# Patient Record
Sex: FEMALE | Race: WHITE | NOT HISPANIC OR LATINO
[De-identification: names, ages, dates, MRNs, and addresses within clinical notes are randomized per-mention and may not be internally consistent; named-entity substitution may affect disease eponyms.]

---

## 2018-03-23 ENCOUNTER — APPOINTMENT (OUTPATIENT)
Dept: OPHTHALMOLOGY | Facility: CLINIC | Age: 68
End: 2018-03-23
Payer: MEDICARE

## 2018-03-23 PROCEDURE — 92083 EXTENDED VISUAL FIELD XM: CPT

## 2018-03-23 PROCEDURE — 92020 GONIOSCOPY: CPT

## 2018-03-23 PROCEDURE — 76514 ECHO EXAM OF EYE THICKNESS: CPT

## 2018-03-23 PROCEDURE — 92004 COMPRE OPH EXAM NEW PT 1/>: CPT

## 2018-03-23 PROCEDURE — 92250 FUNDUS PHOTOGRAPHY W/I&R: CPT

## 2018-08-20 RX ORDER — LATANOPROST/PF 0.005 %
0.01 DROPS OPHTHALMIC (EYE)
Qty: 1 | Refills: 11 | Status: ACTIVE | COMMUNITY
Start: 2018-03-23 | End: 1900-01-01

## 2018-09-21 ENCOUNTER — APPOINTMENT (OUTPATIENT)
Dept: OPHTHALMOLOGY | Facility: CLINIC | Age: 68
End: 2018-09-21

## 2018-12-10 ENCOUNTER — APPOINTMENT (OUTPATIENT)
Dept: OPHTHALMOLOGY | Facility: CLINIC | Age: 68
End: 2018-12-10
Payer: MEDICARE

## 2018-12-10 DIAGNOSIS — H40.003 PREGLAUCOMA, UNSPECIFIED, BILATERAL: ICD-10-CM

## 2018-12-10 PROCEDURE — 92012 INTRM OPH EXAM EST PATIENT: CPT

## 2018-12-10 PROCEDURE — 92083 EXTENDED VISUAL FIELD XM: CPT

## 2019-01-18 ENCOUNTER — APPOINTMENT (OUTPATIENT)
Dept: OPHTHALMOLOGY | Facility: CLINIC | Age: 69
End: 2019-01-18
Payer: MEDICARE

## 2019-01-18 DIAGNOSIS — H52.12 MYOPIA, LEFT EYE: ICD-10-CM

## 2019-01-18 DIAGNOSIS — H52.201 HYPERMETROPIA, RIGHT EYE: ICD-10-CM

## 2019-01-18 DIAGNOSIS — H52.4 MYOPIA, LEFT EYE: ICD-10-CM

## 2019-01-18 DIAGNOSIS — H52.4 HYPERMETROPIA, UNSPECIFIED EYE: ICD-10-CM

## 2019-01-18 DIAGNOSIS — H52.01 HYPERMETROPIA, RIGHT EYE: ICD-10-CM

## 2019-01-18 DIAGNOSIS — H52.202 MYOPIA, LEFT EYE: ICD-10-CM

## 2019-01-18 DIAGNOSIS — H52.4 HYPERMETROPIA, RIGHT EYE: ICD-10-CM

## 2019-01-18 DIAGNOSIS — H52.209 HYPERMETROPIA, UNSPECIFIED EYE: ICD-10-CM

## 2019-01-18 DIAGNOSIS — H52.00 HYPERMETROPIA, UNSPECIFIED EYE: ICD-10-CM

## 2019-01-18 PROCEDURE — 92015 DETERMINE REFRACTIVE STATE: CPT

## 2019-01-22 ENCOUNTER — APPOINTMENT (OUTPATIENT)
Dept: OPHTHALMOLOGY | Facility: CLINIC | Age: 69
End: 2019-01-22

## 2019-02-11 ENCOUNTER — APPOINTMENT (OUTPATIENT)
Dept: OPHTHALMOLOGY | Facility: CLINIC | Age: 69
End: 2019-02-11
Payer: MEDICARE

## 2019-02-11 DIAGNOSIS — H25.813 COMBINED FORMS OF AGE-RELATED CATARACT, BILATERAL: ICD-10-CM

## 2019-02-11 PROCEDURE — 99212 OFFICE O/P EST SF 10 MIN: CPT

## 2019-05-23 ENCOUNTER — FORM ENCOUNTER (OUTPATIENT)
Age: 69
End: 2019-05-23

## 2019-05-24 ENCOUNTER — APPOINTMENT (OUTPATIENT)
Dept: ORTHOPEDIC SURGERY | Facility: CLINIC | Age: 69
End: 2019-05-24
Payer: MEDICARE

## 2019-05-24 ENCOUNTER — APPOINTMENT (OUTPATIENT)
Dept: RADIOLOGY | Facility: CLINIC | Age: 69
End: 2019-05-24

## 2019-05-24 ENCOUNTER — OUTPATIENT (OUTPATIENT)
Dept: OUTPATIENT SERVICES | Facility: HOSPITAL | Age: 69
LOS: 1 days | End: 2019-05-24
Payer: MEDICARE

## 2019-05-24 VITALS — WEIGHT: 160 LBS | BODY MASS INDEX: 30.21 KG/M2 | RESPIRATION RATE: 16 BRPM | HEIGHT: 61 IN

## 2019-05-24 PROCEDURE — 28470 CLTX METATARSAL FX WO MNP EA: CPT | Mod: LT

## 2019-05-24 PROCEDURE — 73630 X-RAY EXAM OF FOOT: CPT

## 2019-05-24 PROCEDURE — 73610 X-RAY EXAM OF ANKLE: CPT

## 2019-05-24 PROCEDURE — 73610 X-RAY EXAM OF ANKLE: CPT | Mod: 26,LT

## 2019-05-24 PROCEDURE — 73630 X-RAY EXAM OF FOOT: CPT | Mod: 26,LT

## 2019-05-24 PROCEDURE — 99204 OFFICE O/P NEW MOD 45 MIN: CPT | Mod: 57

## 2019-06-07 ENCOUNTER — APPOINTMENT (OUTPATIENT)
Dept: OPHTHALMOLOGY | Facility: CLINIC | Age: 69
End: 2019-06-07
Payer: MEDICARE

## 2019-06-07 ENCOUNTER — NON-APPOINTMENT (OUTPATIENT)
Age: 69
End: 2019-06-07

## 2019-06-07 PROCEDURE — 92014 COMPRE OPH EXAM EST PT 1/>: CPT

## 2019-06-07 PROCEDURE — 92133 CPTRZD OPH DX IMG PST SGM ON: CPT

## 2019-06-07 PROCEDURE — 92020 GONIOSCOPY: CPT

## 2019-06-07 PROCEDURE — 92136 OPHTHALMIC BIOMETRY: CPT

## 2019-06-17 ENCOUNTER — APPOINTMENT (OUTPATIENT)
Dept: UROLOGY | Facility: CLINIC | Age: 69
End: 2019-06-17
Payer: MEDICARE

## 2019-06-17 VITALS — DIASTOLIC BLOOD PRESSURE: 82 MMHG | TEMPERATURE: 97.5 F | HEART RATE: 67 BPM | SYSTOLIC BLOOD PRESSURE: 164 MMHG

## 2019-06-17 PROCEDURE — 99204 OFFICE O/P NEW MOD 45 MIN: CPT

## 2019-06-18 LAB
APPEARANCE: CLEAR
BACTERIA: NEGATIVE
BILIRUBIN URINE: NEGATIVE
BLOOD URINE: ABNORMAL
COLOR: NORMAL
GLUCOSE QUALITATIVE U: NEGATIVE
HYALINE CASTS: 0 /LPF
KETONES URINE: NEGATIVE
LEUKOCYTE ESTERASE URINE: NEGATIVE
MICROSCOPIC-UA: NORMAL
NITRITE URINE: NEGATIVE
PH URINE: 8
PROTEIN URINE: NEGATIVE
RED BLOOD CELLS URINE: 8 /HPF
SPECIFIC GRAVITY URINE: 1.02
SQUAMOUS EPITHELIAL CELLS: 2 /HPF
UROBILINOGEN URINE: NORMAL
WHITE BLOOD CELLS URINE: 2 /HPF

## 2019-06-18 NOTE — ASSESSMENT
[FreeTextEntry1] : 67yo female with painless, microscopic hematuria\par Former smoker\par ? remote history of kidney stones\par Recommend UA, urine culture\par Ordered CT urogram\par Recommend f/u for cystoscopy. Reviewed procedure, indications and risks

## 2019-06-18 NOTE — HISTORY OF PRESENT ILLNESS
[FreeTextEntry1] : This is a very pleasant 67yo female referred for microscopic hematuria found on routine testing. She is a former light smoker, quit many years ago. She thinks she may have had a kidney stone several years ago but she is unsure. History of HTN, GERD, otherwise feeling well.  [Urinary Urgency] : urinary urgency [Urinary Frequency] : urinary frequency [Nocturia] : nocturia [Hematuria - Microscopic] : microscopic hematuria [Abdominal Pain] : no abdominal pain [Dysuria] : no dysuria [Flank Pain] : no flank pain [None] : None

## 2019-06-18 NOTE — LETTER BODY
[Dear  ___] : Dear  [unfilled], [Please see my note below.] : Please see my note below. [Courtesy Letter:] : I had the pleasure of seeing your patient, [unfilled], in my office today. [Consult Closing:] : Thank you very much for allowing me to participate in the care of this patient.  If you have any questions, please do not hesitate to contact me. [FreeTextEntry3] : Jose De Jesus Bustos MD\par Urologic Oncology\par Department of Urology\par [facility]\par \par [employer] [facility] \par \par  [Sincerely,] : Sincerely, [FreeTextEntry2] : Dani Bush MD\par [address]\par [place] [address]

## 2019-06-18 NOTE — PHYSICAL EXAM
[General Appearance - Well Developed] : well developed [Normal Appearance] : normal appearance [General Appearance - Well Nourished] : well nourished [Edema] : no peripheral edema [General Appearance - In No Acute Distress] : no acute distress [Well Groomed] : well groomed [Respiration, Rhythm And Depth] : normal respiratory rhythm and effort [Exaggerated Use Of Accessory Muscles For Inspiration] : no accessory muscle use [Abdomen Tenderness] : non-tender [Abdomen Soft] : soft [] : no rash [Costovertebral Angle Tenderness] : no ~M costovertebral angle tenderness [Normal Station and Gait] : the gait and station were normal for the patient's age [Oriented To Time, Place, And Person] : oriented to person, place, and time [No Focal Deficits] : no focal deficits [Affect] : the affect was normal [Mood] : the mood was normal [Not Anxious] : not anxious

## 2019-06-19 LAB — BACTERIA UR CULT: NORMAL

## 2019-07-05 ENCOUNTER — APPOINTMENT (OUTPATIENT)
Dept: CT IMAGING | Facility: HOSPITAL | Age: 69
End: 2019-07-05

## 2019-07-31 ENCOUNTER — APPOINTMENT (OUTPATIENT)
Dept: UROLOGY | Facility: CLINIC | Age: 69
End: 2019-07-31

## 2019-08-28 ENCOUNTER — APPOINTMENT (OUTPATIENT)
Dept: UROLOGY | Facility: CLINIC | Age: 69
End: 2019-08-28

## 2019-09-21 ENCOUNTER — APPOINTMENT (OUTPATIENT)
Dept: CT IMAGING | Facility: HOSPITAL | Age: 69
End: 2019-09-21
Payer: MEDICARE

## 2019-09-24 ENCOUNTER — FORM ENCOUNTER (OUTPATIENT)
Age: 69
End: 2019-09-24

## 2019-09-25 ENCOUNTER — APPOINTMENT (OUTPATIENT)
Dept: CT IMAGING | Facility: HOSPITAL | Age: 69
End: 2019-09-25

## 2019-09-25 ENCOUNTER — OUTPATIENT (OUTPATIENT)
Dept: OUTPATIENT SERVICES | Facility: HOSPITAL | Age: 69
LOS: 1 days | End: 2019-09-25
Payer: MEDICARE

## 2019-09-25 PROCEDURE — 74178 CT ABD&PLV WO CNTR FLWD CNTR: CPT | Mod: 26

## 2019-09-25 PROCEDURE — 74178 CT ABD&PLV WO CNTR FLWD CNTR: CPT

## 2019-10-01 ENCOUNTER — RESULT REVIEW (OUTPATIENT)
Age: 69
End: 2019-10-01

## 2021-04-11 ENCOUNTER — NON-APPOINTMENT (OUTPATIENT)
Age: 71
End: 2021-04-11

## 2021-04-13 ENCOUNTER — APPOINTMENT (OUTPATIENT)
Dept: OPHTHALMOLOGY | Facility: CLINIC | Age: 71
End: 2021-04-13
Payer: MEDICARE

## 2021-04-13 ENCOUNTER — NON-APPOINTMENT (OUTPATIENT)
Age: 71
End: 2021-04-13

## 2021-04-13 PROCEDURE — 99072 ADDL SUPL MATRL&STAF TM PHE: CPT

## 2021-04-13 PROCEDURE — 92014 COMPRE OPH EXAM EST PT 1/>: CPT

## 2021-04-13 PROCEDURE — 92133 CPTRZD OPH DX IMG PST SGM ON: CPT

## 2021-05-05 ENCOUNTER — APPOINTMENT (OUTPATIENT)
Dept: PHYSICAL MEDICINE AND REHAB | Facility: CLINIC | Age: 71
End: 2021-05-05

## 2021-06-30 ENCOUNTER — APPOINTMENT (OUTPATIENT)
Dept: ORTHOPEDIC SURGERY | Facility: CLINIC | Age: 71
End: 2021-06-30

## 2021-08-24 ENCOUNTER — NON-APPOINTMENT (OUTPATIENT)
Age: 71
End: 2021-08-24

## 2021-08-24 ENCOUNTER — APPOINTMENT (OUTPATIENT)
Dept: OPHTHALMOLOGY | Facility: CLINIC | Age: 71
End: 2021-08-24
Payer: MEDICARE

## 2021-08-24 PROCEDURE — 92014 COMPRE OPH EXAM EST PT 1/>: CPT

## 2021-08-24 PROCEDURE — 92133 CPTRZD OPH DX IMG PST SGM ON: CPT

## 2021-09-27 ENCOUNTER — APPOINTMENT (OUTPATIENT)
Dept: RHEUMATOLOGY | Facility: CLINIC | Age: 71
End: 2021-09-27

## 2021-10-26 ENCOUNTER — LABORATORY RESULT (OUTPATIENT)
Age: 71
End: 2021-10-26

## 2021-10-26 ENCOUNTER — APPOINTMENT (OUTPATIENT)
Dept: RHEUMATOLOGY | Facility: CLINIC | Age: 71
End: 2021-10-26
Payer: MEDICARE

## 2021-10-26 VITALS
HEART RATE: 73 BPM | OXYGEN SATURATION: 98 % | HEIGHT: 61 IN | BODY MASS INDEX: 29.45 KG/M2 | TEMPERATURE: 98.7 F | WEIGHT: 156 LBS | DIASTOLIC BLOOD PRESSURE: 61 MMHG | SYSTOLIC BLOOD PRESSURE: 201 MMHG | RESPIRATION RATE: 14 BRPM

## 2021-10-26 VITALS — DIASTOLIC BLOOD PRESSURE: 90 MMHG | SYSTOLIC BLOOD PRESSURE: 210 MMHG

## 2021-10-26 VITALS — SYSTOLIC BLOOD PRESSURE: 215 MMHG | DIASTOLIC BLOOD PRESSURE: 79 MMHG

## 2021-10-26 DIAGNOSIS — D68.59 OTHER PRIMARY THROMBOPHILIA: ICD-10-CM

## 2021-10-26 DIAGNOSIS — R76.8 OTHER SPECIFIED ABNORMAL IMMUNOLOGICAL FINDINGS IN SERUM: ICD-10-CM

## 2021-10-26 PROCEDURE — 99204 OFFICE O/P NEW MOD 45 MIN: CPT

## 2021-10-26 NOTE — PHYSICAL EXAM
[General Appearance - Well Nourished] : well nourished [General Appearance - Well Developed] : well developed [Sclera] : the sclera and conjunctiva were normal [Auscultation Breath Sounds / Voice Sounds] : lungs were clear to auscultation bilaterally [Heart Sounds] : normal S1 and S2 [Heart Sounds Gallop] : no gallops [Abdomen Tenderness] : non-tender [] : no rash [Skin Lesions] : no skin lesions [Oriented To Time, Place, And Person] : oriented to person, place, and time [FreeTextEntry1] : murmur ausculated in mitral area

## 2021-10-26 NOTE — ASSESSMENT
[FreeTextEntry1] : 72 y/o F w positive JULIO C\par =positive JULIO C\par =hx of pruritic rashes\par =dry eyes\par \par Pt w positive JULIO C. \par \par Explained that positive JULIO C is a general test, which is found in high titers in autoimmune dz. Explained that positive JULIO C is not diagnostic of AI dz, and patient wout autoimmune dz can also have high JULIO C. Individuals w high JULIO C are at higher risk of developing AI dz however. \par \par Given hx of SICCA symptoms and rashes, will screen for AI dz. \par \par Pt w osteopenia, and two foot fractures. While FRAX score calculated does not indicated tx, will have discuss w patient risk and benefits for tx osteopenia given pt w 2 past fractures. \par \par Plan\par -Labs w serologies, inflammatory markers\par RTO in 2 weeks to discuss dx and treatment plan\par

## 2021-10-26 NOTE — HISTORY OF PRESENT ILLNESS
[FreeTextEntry1] : Referring physicain: Dr. Lupillo Rodarte\par T: [phone]\par F: [phone]\par \par 70 y/o F here for consultation \par \par Pt getting pruritic rashes for 1 year. Pt describes white/red patches in legs, w bruising. Pt went to allergist, and had skin testing. No allergies were found. Pt was found to have positive JULIO C 1:320. \par \par No new beauty product, no new wash, no detergent, new animals. \par \par Reports R knee pain, swelling. Worse w walking standing. Pt only able to walk 60 steps. AM stiffness that lasts less than 10 minutes. Uses voltaren. \par Pt was sent to PT, which helps. \par \par Pt also recenlty had DEXA scan showing osteopenia. Pt 4 years ago, had two fractures, one on each feet after fall. \par \par No fevers, h/a, hair loss, oral ulcers, epistaxis, sinusitis,  swollen glands, dry mouth, CP, SOB, cough, vision changes, abdominal pain,  n/v/d, blood in stool or urine, focal weakness, sensory loss,  Raynaud's,swelling, weight loss. \par +dry eyes, uses artificial tears \par +GERD \par \par

## 2021-10-26 NOTE — CONSULT LETTER
[Dear  ___] : Dear  [unfilled], [Consult Letter:] : I had the pleasure of evaluating your patient, [unfilled]. [Consult Closing:] : Thank you very much for allowing me to participate in the care of this patient.  If you have any questions, please do not hesitate to contact me. [Sincerely,] : Sincerely, [FreeTextEntry2] : Dr. Lupillo Rodarte\par [address] [facility] [address], \[place] [address] [FreeTextEntry3] : Jeronimo Storey MD

## 2021-10-26 NOTE — DATA REVIEWED
[FreeTextEntry1] : Lab reviewed from 6/21\par JULIO C 1:320\par \par DEXA scan reviewed from outside:\par T score -1.5

## 2021-11-03 ENCOUNTER — NON-APPOINTMENT (OUTPATIENT)
Age: 71
End: 2021-11-03

## 2021-11-03 DIAGNOSIS — R70.0 ELEVATED ERYTHROCYTE SEDIMENTATION RATE: ICD-10-CM

## 2021-11-05 PROBLEM — R70.0 ELEVATED ERYTHROCYTE SEDIMENTATION RATE: Status: ACTIVE | Noted: 2021-11-05

## 2021-11-05 LAB
25(OH)D3 SERPL-MCNC: 62 NG/ML
ALBUMIN SERPL ELPH-MCNC: 4.7 G/DL
ALP BLD-CCNC: 84 U/L
ALT SERPL-CCNC: 13 U/L
ANA PAT FLD IF-IMP: ABNORMAL
ANA SER IF-ACNC: ABNORMAL
ANION GAP SERPL CALC-SCNC: 17 MMOL/L
APPEARANCE: CLEAR
AST SERPL-CCNC: 25 U/L
B2 GLYCOPROT1 IGA SERPL IA-ACNC: 44.4 SAU
B2 GLYCOPROT1 IGG SER-ACNC: <5 SGU
B2 GLYCOPROT1 IGM SER-ACNC: <5 SMU
BACTERIA: NEGATIVE
BASOPHILS # BLD AUTO: 0.02 K/UL
BASOPHILS NFR BLD AUTO: 0.3 %
BILIRUB SERPL-MCNC: 0.2 MG/DL
BILIRUBIN URINE: NEGATIVE
BLOOD URINE: ABNORMAL
BUN SERPL-MCNC: 20 MG/DL
C3 SERPL-MCNC: 126 MG/DL
C4 SERPL-MCNC: 18 MG/DL
CALCIUM SERPL-MCNC: 10.2 MG/DL
CARDIOLIPIN IGM SER-MCNC: 5.3 GPL
CARDIOLIPIN IGM SER-MCNC: <5 MPL
CHLORIDE SERPL-SCNC: 98 MMOL/L
CK SERPL-CCNC: 45 U/L
CO2 SERPL-SCNC: 22 MMOL/L
COLOR: NORMAL
CREAT SERPL-MCNC: 0.51 MG/DL
CREAT SPEC-SCNC: 41 MG/DL
CREAT/PROT UR: 0.2 RATIO
CRP SERPL-MCNC: 7 MG/L
DEPRECATED CARDIOLIPIN IGA SER: 5.8 APL
DSDNA AB SER-ACNC: 22 IU/ML
ENA RNP AB SER IA-ACNC: 1.1 AL
ENA SM AB SER IA-ACNC: <0.2 AL
ENA SS-A AB SER IA-ACNC: <0.2 AL
ENA SS-B AB SER IA-ACNC: <0.2 AL
EOSINOPHIL # BLD AUTO: 0.02 K/UL
EOSINOPHIL NFR BLD AUTO: 0.3 %
ERYTHROCYTE [SEDIMENTATION RATE] IN BLOOD BY WESTERGREN METHOD: 71 MM/HR
G6PD SER-CCNC: 12.5 U/G HGB
GLUCOSE QUALITATIVE U: NEGATIVE
GLUCOSE SERPL-MCNC: 118 MG/DL
HCT VFR BLD CALC: 39.8 %
HGB BLD-MCNC: 12.5 G/DL
HYALINE CASTS: 0 /LPF
IMM GRANULOCYTES NFR BLD AUTO: 0.3 %
KETONES URINE: NEGATIVE
LEUKOCYTE ESTERASE URINE: NEGATIVE
LYMPHOCYTES # BLD AUTO: 1.41 K/UL
LYMPHOCYTES NFR BLD AUTO: 22.1 %
MAN DIFF?: NORMAL
MCHC RBC-ENTMCNC: 29 PG
MCHC RBC-ENTMCNC: 31.4 GM/DL
MCV RBC AUTO: 92.3 FL
MICROSCOPIC-UA: NORMAL
MONOCYTES # BLD AUTO: 0.41 K/UL
MONOCYTES NFR BLD AUTO: 6.4 %
NEUTROPHILS # BLD AUTO: 4.51 K/UL
NEUTROPHILS NFR BLD AUTO: 70.6 %
NITRITE URINE: NEGATIVE
PH URINE: 6.5
PLATELET # BLD AUTO: 247 K/UL
POTASSIUM SERPL-SCNC: 4.3 MMOL/L
PROT SERPL-MCNC: 7.9 G/DL
PROT UR-MCNC: 7 MG/DL
PROTEIN URINE: NEGATIVE
RBC # BLD: 4.31 M/UL
RBC # FLD: 14.2 %
RED BLOOD CELLS URINE: 9 /HPF
SODIUM SERPL-SCNC: 137 MMOL/L
SPECIFIC GRAVITY URINE: 1.01
SQUAMOUS EPITHELIAL CELLS: 1 /HPF
UROBILINOGEN URINE: NORMAL
WBC # FLD AUTO: 6.39 K/UL
WHITE BLOOD CELLS URINE: 0 /HPF

## 2021-11-08 ENCOUNTER — EMERGENCY (EMERGENCY)
Facility: HOSPITAL | Age: 71
LOS: 1 days | Discharge: HOME | End: 2021-11-08
Attending: EMERGENCY MEDICINE | Admitting: EMERGENCY MEDICINE
Payer: MEDICARE

## 2021-11-08 ENCOUNTER — APPOINTMENT (OUTPATIENT)
Dept: UROLOGY | Facility: CLINIC | Age: 71
End: 2021-11-08
Payer: MEDICARE

## 2021-11-08 VITALS — HEART RATE: 76 BPM | SYSTOLIC BLOOD PRESSURE: 214 MMHG | TEMPERATURE: 98.3 F | DIASTOLIC BLOOD PRESSURE: 76 MMHG

## 2021-11-08 VITALS — DIASTOLIC BLOOD PRESSURE: 79 MMHG | SYSTOLIC BLOOD PRESSURE: 204 MMHG

## 2021-11-08 VITALS
OXYGEN SATURATION: 100 % | DIASTOLIC BLOOD PRESSURE: 78 MMHG | SYSTOLIC BLOOD PRESSURE: 211 MMHG | HEIGHT: 61 IN | WEIGHT: 158.95 LBS | RESPIRATION RATE: 18 BRPM | HEART RATE: 85 BPM | TEMPERATURE: 98 F

## 2021-11-08 DIAGNOSIS — I10 ESSENTIAL (PRIMARY) HYPERTENSION: ICD-10-CM

## 2021-11-08 DIAGNOSIS — Z86.79 PERSONAL HISTORY OF OTHER DISEASES OF THE CIRCULATORY SYSTEM: ICD-10-CM

## 2021-11-08 LAB
ALBUMIN SERPL ELPH-MCNC: 4.2 G/DL — SIGNIFICANT CHANGE UP (ref 3.3–5)
ALP SERPL-CCNC: 75 U/L — SIGNIFICANT CHANGE UP (ref 40–120)
ALT FLD-CCNC: 11 U/L — SIGNIFICANT CHANGE UP (ref 10–45)
ANION GAP SERPL CALC-SCNC: 11 MMOL/L — SIGNIFICANT CHANGE UP (ref 5–17)
AST SERPL-CCNC: 23 U/L — SIGNIFICANT CHANGE UP (ref 10–40)
BASOPHILS # BLD AUTO: 0.04 K/UL — SIGNIFICANT CHANGE UP (ref 0–0.2)
BASOPHILS NFR BLD AUTO: 0.5 % — SIGNIFICANT CHANGE UP (ref 0–2)
BILIRUB SERPL-MCNC: 0.2 MG/DL — SIGNIFICANT CHANGE UP (ref 0.2–1.2)
BUN SERPL-MCNC: 12 MG/DL — SIGNIFICANT CHANGE UP (ref 7–23)
CALCIUM SERPL-MCNC: 10.7 MG/DL — HIGH (ref 8.4–10.5)
CHLORIDE SERPL-SCNC: 99 MMOL/L — SIGNIFICANT CHANGE UP (ref 96–108)
CO2 SERPL-SCNC: 27 MMOL/L — SIGNIFICANT CHANGE UP (ref 22–31)
CREAT SERPL-MCNC: 0.51 MG/DL — SIGNIFICANT CHANGE UP (ref 0.5–1.3)
EOSINOPHIL # BLD AUTO: 0.02 K/UL — SIGNIFICANT CHANGE UP (ref 0–0.5)
EOSINOPHIL NFR BLD AUTO: 0.3 % — SIGNIFICANT CHANGE UP (ref 0–6)
GLUCOSE SERPL-MCNC: 106 MG/DL — HIGH (ref 70–99)
HCT VFR BLD CALC: 38.3 % — SIGNIFICANT CHANGE UP (ref 34.5–45)
HGB BLD-MCNC: 12.4 G/DL — SIGNIFICANT CHANGE UP (ref 11.5–15.5)
IMM GRANULOCYTES NFR BLD AUTO: 0.3 % — SIGNIFICANT CHANGE UP (ref 0–1.5)
LYMPHOCYTES # BLD AUTO: 1.42 K/UL — SIGNIFICANT CHANGE UP (ref 1–3.3)
LYMPHOCYTES # BLD AUTO: 18.4 % — SIGNIFICANT CHANGE UP (ref 13–44)
MCHC RBC-ENTMCNC: 28.9 PG — SIGNIFICANT CHANGE UP (ref 27–34)
MCHC RBC-ENTMCNC: 32.4 GM/DL — SIGNIFICANT CHANGE UP (ref 32–36)
MCV RBC AUTO: 89.3 FL — SIGNIFICANT CHANGE UP (ref 80–100)
MONOCYTES # BLD AUTO: 0.5 K/UL — SIGNIFICANT CHANGE UP (ref 0–0.9)
MONOCYTES NFR BLD AUTO: 6.5 % — SIGNIFICANT CHANGE UP (ref 2–14)
NEUTROPHILS # BLD AUTO: 5.7 K/UL — SIGNIFICANT CHANGE UP (ref 1.8–7.4)
NEUTROPHILS NFR BLD AUTO: 74 % — SIGNIFICANT CHANGE UP (ref 43–77)
NRBC # BLD: 0 /100 WBCS — SIGNIFICANT CHANGE UP (ref 0–0)
PLATELET # BLD AUTO: 213 K/UL — SIGNIFICANT CHANGE UP (ref 150–400)
POTASSIUM SERPL-MCNC: 4.3 MMOL/L — SIGNIFICANT CHANGE UP (ref 3.5–5.3)
POTASSIUM SERPL-SCNC: 4.3 MMOL/L — SIGNIFICANT CHANGE UP (ref 3.5–5.3)
PROT SERPL-MCNC: 8.2 G/DL — SIGNIFICANT CHANGE UP (ref 6–8.3)
RBC # BLD: 4.29 M/UL — SIGNIFICANT CHANGE UP (ref 3.8–5.2)
RBC # FLD: 13.8 % — SIGNIFICANT CHANGE UP (ref 10.3–14.5)
SODIUM SERPL-SCNC: 137 MMOL/L — SIGNIFICANT CHANGE UP (ref 135–145)
TROPONIN T SERPL-MCNC: 0.01 NG/ML — SIGNIFICANT CHANGE UP (ref 0–0.01)
WBC # BLD: 7.7 K/UL — SIGNIFICANT CHANGE UP (ref 3.8–10.5)
WBC # FLD AUTO: 7.7 K/UL — SIGNIFICANT CHANGE UP (ref 3.8–10.5)

## 2021-11-08 PROCEDURE — 99213 OFFICE O/P EST LOW 20 MIN: CPT

## 2021-11-08 PROCEDURE — 84484 ASSAY OF TROPONIN QUANT: CPT

## 2021-11-08 PROCEDURE — 36415 COLL VENOUS BLD VENIPUNCTURE: CPT

## 2021-11-08 PROCEDURE — 85025 COMPLETE CBC W/AUTO DIFF WBC: CPT

## 2021-11-08 PROCEDURE — 99284 EMERGENCY DEPT VISIT MOD MDM: CPT

## 2021-11-08 PROCEDURE — 80053 COMPREHEN METABOLIC PANEL: CPT

## 2021-11-08 PROCEDURE — 99283 EMERGENCY DEPT VISIT LOW MDM: CPT

## 2021-11-08 RX ORDER — ACETAMINOPHEN 500 MG
650 TABLET ORAL ONCE
Refills: 0 | Status: COMPLETED | OUTPATIENT
Start: 2021-11-08 | End: 2021-11-08

## 2021-11-08 RX ORDER — AMLODIPINE BESYLATE 2.5 MG/1
5 TABLET ORAL ONCE
Refills: 0 | Status: COMPLETED | OUTPATIENT
Start: 2021-11-08 | End: 2021-11-08

## 2021-11-08 RX ADMIN — Medication 650 MILLIGRAM(S): at 17:02

## 2021-11-08 RX ADMIN — AMLODIPINE BESYLATE 5 MILLIGRAM(S): 2.5 TABLET ORAL at 17:03

## 2021-11-08 NOTE — ED ADULT TRIAGE NOTE - CHIEF COMPLAINT QUOTE
Patient states went to see Urologist/Dr. Mcnally and was found to have blood in urine and elevated /80, sent to ED due to elevated BP.  Patient denies any headache, no dizziness, no lightheadedness.  States just feeling some fatigue and bilateral knee pain, no dysuria.

## 2021-11-08 NOTE — LETTER BODY
[Dear  ___] : Dear  [unfilled], [Courtesy Letter:] : I had the pleasure of seeing your patient, [unfilled], in my office today. [Please see my note below.] : Please see my note below. [Consult Closing:] : Thank you very much for allowing me to participate in the care of this patient.  If you have any questions, please do not hesitate to contact me. [Sincerely,] : Sincerely, [FreeTextEntry2] : Dani Bush MD\par [address], [address]\par [place] [address] [FreeTextEntry3] : Jose De Jesus Bustos MD\par Urologic Oncology\par Department of Urology\par [facility]\par \par [employer] [facility] \par \par

## 2021-11-08 NOTE — HISTORY OF PRESENT ILLNESS
[FreeTextEntry1] : This is a very pleasant 67yo female referred for microscopic hematuria found on routine testing. She is a former light smoker, quit many years ago. She thinks she may have had a kidney stone several years ago but she is unsure. History of HTN, GERD, otherwise feeling well. \par \par 11/8/21 Last seen in 2019, lost to follow up due to pandemic. Completed CT urogram which was normal but did not return for cysto. Recent UA still shows microhematuria. Blood pressure is very high > 200 systolic. No headache/dizziness/chest pain or blurry vision.  [Dysuria] : no dysuria [Hematuria - Microscopic] : microscopic hematuria [Abdominal Pain] : no abdominal pain [Flank Pain] : no flank pain [None] : None

## 2021-11-08 NOTE — REVIEW OF SYSTEMS
[Fever] : no fever [Chills] : no chills [Feeling Poorly] : not feeling poorly [Feeling Tired] : not feeling tired [Arthralgias] : arthralgias [Negative] : Psychiatric

## 2021-11-08 NOTE — ED PROVIDER NOTE - CLINICAL SUMMARY MEDICAL DECISION MAKING FREE TEXT BOX
72 y/o F presents for hypertension in 200s/80s while at urology office today. Plan for basic labs and pain med. 72 y/o F presents for hypertension in 200s/80s while at urology office today. Plan for basic labs, pain med, and Norvasc.

## 2021-11-08 NOTE — ED PROVIDER NOTE - NSFOLLOWUPINSTRUCTIONS_ED_ALL_ED_FT
Please call your PCP tomorrow am to set up an urgent appointment to discuss your high blood pressures for the past several weeks. Your blood work was normal today.     Hypertension    Hypertension, commonly called high blood pressure, is when the force of blood pumping through your arteries is too strong. Hypertension forces your heart to work harder to pump blood. Your arteries may become narrow or stiff. Having untreated or uncontrolled hypertension for a long period of time can cause heart attack, stroke, kidney disease, and other problems. If started on a medication, take exactly as prescribed by your health care professional. Maintain a healthy lifestyle and follow up with your primary care physician.    SEEK IMMEDIATE MEDICAL CARE IF YOU HAVE ANY OF THE FOLLOWING SYMPTOMS: severe headache, confusion, chest pain, abdominal pain, vomiting, or shortness of breath.

## 2021-11-08 NOTE — ASSESSMENT
[FreeTextEntry1] : 70yo female with painless, microscopic hematuria, first diagnosed 2019 but lost to followup\par Former smoker\par CT urogram 2019 negative\par F/u for cystoscopy and renal US\par Repeat UA, culture, cytology\par \par Blood pressure > 200 systolic. Asymptomatic. Advised to go to [facility] ER immediately for evaluation

## 2021-11-08 NOTE — ED PROVIDER NOTE - PATIENT PORTAL LINK FT
You can access the FollowMyHealth Patient Portal offered by [facility] by registering at the following website: [email or web address]. By joining [employer]’s FollowMyHealth portal, you will also be able to view your health information using other applications (apps) compatible with our system.

## 2021-11-09 LAB
APPEARANCE: CLEAR
BACTERIA: NORMAL /HPF
BILIRUBIN URINE: NEGATIVE
BLOOD URINE: ABNORMAL
COLOR: YELLOW
GLUCOSE QUALITATIVE U: NEGATIVE
KETONES URINE: NEGATIVE
LEUKOCYTE ESTERASE URINE: ABNORMAL
MICROSCOPIC-UA: NORMAL
NITRITE URINE: NEGATIVE
PH URINE: 6
PROTEIN URINE: NEGATIVE MG/DL
RED BLOOD CELLS URINE: < 5 /HPF
SPECIFIC GRAVITY URINE: <=1.005
SQUAMOUS EPITHELIAL CELLS: NORMAL /HPF
UROBILINOGEN URINE: 0.2 E.U./DL
WHITE BLOOD CELLS URINE: < 5 /HPF

## 2021-11-10 LAB — URINE CYTOLOGY: NORMAL

## 2021-11-11 PROBLEM — I10 ESSENTIAL (PRIMARY) HYPERTENSION: Chronic | Status: ACTIVE | Noted: 2021-11-08

## 2021-11-12 LAB — BACTERIA UR CULT: ABNORMAL

## 2021-11-15 ENCOUNTER — APPOINTMENT (OUTPATIENT)
Dept: UROLOGY | Facility: CLINIC | Age: 71
End: 2021-11-15

## 2021-11-19 LAB
EJ AB SER QL: NEGATIVE
ENA JO1 AB SER IA-ACNC: <20 UNITS
ENA PM/SCL AB SER-ACNC: 34 UNITS
ENA SM+RNP AB SER IA-ACNC: <20 UNITS
ENA SS-A IGG SER QL: <20 UNITS
FIBRILLARIN AB SER QL: NEGATIVE
KU AB SER QL: NEGATIVE
MDA-5 (P140)(CADM-140): <20 UNITS
MI2 AB SER QL: NEGATIVE
NXP-2 (P140): <20 UNITS
OJ AB SER QL: NEGATIVE
PL12 AB SER QL: NEGATIVE
PL7 AB SER QL: NEGATIVE
SRP AB SERPL QL: NEGATIVE
TIF GAMMA (P155/140): <20 UNITS
U2 SNRNP AB SER QL: NEGATIVE

## 2021-11-30 ENCOUNTER — APPOINTMENT (OUTPATIENT)
Dept: HEART AND VASCULAR | Facility: CLINIC | Age: 71
End: 2021-11-30

## 2022-01-05 ENCOUNTER — NON-APPOINTMENT (OUTPATIENT)
Age: 72
End: 2022-01-05

## 2022-01-12 NOTE — PHYSICAL EXAM
[LE] : Sensory: Intact in bilateral lower extremities [Normal RLE] : Right Lower Extremity: No scars, rashes, lesions, ulcers, skin intact [Normal LLE] : Left Lower Extremity: No scars, rashes, lesions, ulcers, skin intact [Normal Touch] : sensation intact for touch [Normal] : Oriented to person, place, and time, insight and judgement were intact and the affect was normal [de-identified] : Right knee with left for comparison [de-identified] : \par X-rays of the right knee weightbearing 4 views today

## 2022-01-13 ENCOUNTER — APPOINTMENT (OUTPATIENT)
Dept: ORTHOPEDIC SURGERY | Facility: CLINIC | Age: 72
End: 2022-01-13

## 2022-01-21 ENCOUNTER — APPOINTMENT (OUTPATIENT)
Dept: ORTHOPEDIC SURGERY | Facility: CLINIC | Age: 72
End: 2022-01-21
Payer: MEDICARE

## 2022-01-21 VITALS
DIASTOLIC BLOOD PRESSURE: 73 MMHG | WEIGHT: 154 LBS | HEIGHT: 61 IN | SYSTOLIC BLOOD PRESSURE: 183 MMHG | BODY MASS INDEX: 29.07 KG/M2

## 2022-01-21 PROCEDURE — 99213 OFFICE O/P EST LOW 20 MIN: CPT | Mod: 25

## 2022-01-21 PROCEDURE — 73564 X-RAY EXAM KNEE 4 OR MORE: CPT | Mod: 50

## 2022-01-21 PROCEDURE — 20610 DRAIN/INJ JOINT/BURSA W/O US: CPT | Mod: RT

## 2022-01-21 NOTE — ASSESSMENT
[FreeTextEntry1] : 71-year-old woman with severe right knee medial compartment osteoarthritis with possible evidence of prior avascular necrosis based on findings on the MRI.  In August there was significant bone marrow edema in the medial compartment femoral condyle and tibial plateau which may be reactive from the arthritis.  There is also a joint effusion which seems to have resolved.  She has chronic pain mostly with walking and not at rest.  Steroid injection was done today to see if this gives her relief.  She does not tolerate NSAIDs well but could take Tylenol and use Voltaren gel as needed.\par She should continue with the exercises she learned in therapy which she has been doing on her own and we gave her a sheet of exercises today as well.  Low impact exercise.\par She is somewhat overweight.  Weight loss can help.\par Heat and ice as needed.  She had very elevated sed rate and JULIO C.  No rheumatoid arthritis was diagnosed.  Her x-rays and MRI look more like degenerative arthritis than inflammatory arthritis.\par I spoke to her about hyaluronic acid injections as another option and ultimately knee replacement may be considered.\par She will follow-up in about 4 to 6 weeks and we will see how she is doing.

## 2022-01-21 NOTE — PROCEDURE
[Injection] : Injection [Right] : of the right [Knee Joint] : knee joint [Osteoarthritis] : Osteoarthritis [Patient] : patient [Risk] : risk [Benefits] : benefits [Alternatives] : alternatives [Bleeding] : bleeding [Infection] : infection [Allergic Reaction] : allergic reaction [Verbal Consent Obtained] : verbal consent was obtained prior to the procedure [Alcohol] : Alcohol [Betadine] : Betadine [Ethyl Chloride Spray] : ethyl chloride spray was used as a topical anesthetic [Lateral] : lateral [Same Needle/New Syringe] : the syringe was changed and the same needle was left in place and [1% Lidocaine___(mL)] : [unfilled] mL of 1% Lidocaine [Triamcinolone 40mg/mL___(mL)] : [unfilled] ~UmL of 40mg/mL triamcinolone [Bandage Applied] : a bandage [Tolerated Well] : The patient tolerated the procedure well [None] : none [No Strenuous Activity___day(s)] : avoid strenuous activity for [unfilled] day(s) [PRN] : as needed

## 2022-01-21 NOTE — PHYSICAL EXAM
[Slightly Antalgic] : slightly antalgic [LE] : Sensory: Intact in bilateral lower extremities [Normal RLE] : Right Lower Extremity: No scars, rashes, lesions, ulcers, skin intact [Normal LLE] : Left Lower Extremity: No scars, rashes, lesions, ulcers, skin intact [Normal Touch] : sensation intact for touch [Normal] : Oriented to person, place, and time, insight and judgement were intact and the affect was normal [de-identified] : Knees\par Mildly antalgic gait.\par No edema, ecchymoses, erythema.  No effusion of the knees.  No significant warmth.\par Knee range of motion is from few degree flexion contracture on the right to 120 to 125 degrees with crepitus and pain on full flexion in the right knee.  Left knee is from 0 to 130 degrees without pain.\par Normal varus and valgus laxity.  Ia Lachman.  Negative anterior and posterior drawer.\par Intact extensor mechanism.\par Tender medial joint line and patella facets right knee and minimally on the left.\par \par Nodules DIP and less PIP joints in the hands consistent with osteoarthritis of her hands [de-identified] : Overweight [de-identified] : \par X-rays of the knees weightbearing 4 views taken today showed severe degenerative change in the medial compartment of the right knee and mild in the left knee with joint space narrowing.  There is subchondral sclerosis.  Mild degeneration patellofemoral joint.\par \par August 9, 2021 MRI of the right knee at [facility] showed severe loss of cartilage and subchondral bone marrow edema in the medial femoral condyle and medial tibial plateau and extrusion of the meniscus consistent with severe degeneration.  Degeneration of the meniscus.  Mucoid degeneration of the ACL as well.  Degeneration lateral compartment as well.  Moderate joint effusion

## 2022-01-31 ENCOUNTER — APPOINTMENT (OUTPATIENT)
Dept: HEART AND VASCULAR | Facility: CLINIC | Age: 72
End: 2022-01-31
Payer: MEDICARE

## 2022-01-31 ENCOUNTER — NON-APPOINTMENT (OUTPATIENT)
Age: 72
End: 2022-01-31

## 2022-01-31 ENCOUNTER — RX RENEWAL (OUTPATIENT)
Age: 72
End: 2022-01-31

## 2022-01-31 ENCOUNTER — APPOINTMENT (OUTPATIENT)
Dept: OPHTHALMOLOGY | Facility: CLINIC | Age: 72
End: 2022-01-31
Payer: MEDICARE

## 2022-01-31 VITALS
SYSTOLIC BLOOD PRESSURE: 183 MMHG | HEIGHT: 61 IN | BODY MASS INDEX: 29.07 KG/M2 | TEMPERATURE: 97.4 F | WEIGHT: 154 LBS | HEART RATE: 66 BPM | DIASTOLIC BLOOD PRESSURE: 73 MMHG

## 2022-01-31 DIAGNOSIS — R07.9 CHEST PAIN, UNSPECIFIED: ICD-10-CM

## 2022-01-31 PROCEDURE — 92014 COMPRE OPH EXAM EST PT 1/>: CPT

## 2022-01-31 PROCEDURE — 92133 CPTRZD OPH DX IMG PST SGM ON: CPT

## 2022-01-31 PROCEDURE — 99205 OFFICE O/P NEW HI 60 MIN: CPT | Mod: 25

## 2022-01-31 PROCEDURE — 92136 OPHTHALMIC BIOMETRY: CPT

## 2022-01-31 PROCEDURE — 93000 ELECTROCARDIOGRAM COMPLETE: CPT

## 2022-01-31 RX ORDER — LOSARTAN POTASSIUM 100 MG/1
TABLET, FILM COATED ORAL
Refills: 0 | Status: DISCONTINUED | COMMUNITY
End: 2022-01-31

## 2022-01-31 RX ORDER — LABETALOL HYDROCHLORIDE 100 MG/1
100 TABLET, FILM COATED ORAL
Refills: 0 | Status: DISCONTINUED | COMMUNITY
End: 2022-01-31

## 2022-01-31 NOTE — HISTORY OF PRESENT ILLNESS
[FreeTextEntry1] : \par 70 y/o female with h/o htn, glaucoma, overweight, hematuria who presents for initial evaluation today for clearance for cataract surgery next week\par \par \par notes new cp in past few months\par notes some maria\par no syncope, lh, palpitations, edema, orthopnea, pnd\par \par notes snoring\par \par not exercising\par \par Had moderate hiatal hernia on CT abd/pelvis\par \par seen by rheum for elevated esr/funmilayo\par \par PMH/PSH:\par htn\par glaucoma\par funmilayo positive\par hematuria\par low vit D\par osteopenia\par thrombophilia\par gerd\par gout\par cataract\par overweight\par moderate hiatal hernia\par \par MEDS:\par amlodipine/benazepril 10/40\par labetalol 100 mg qd\par prilosec\par \par \par ALL:\par epi\par \par \par SH:\par no tobacco, quit , former tobacco 20 years, 5-10 cigs/day\par no etoh/drugs\par from [place]\par lived [place] since \par retired - used to work for nonprofit \par \par no children\par \par \par FH:\par mother - heart disease 50's, htn,  91\par father - h/o prostate disease,  73\par siblings - htn\par

## 2022-01-31 NOTE — PHYSICAL EXAM

## 2022-01-31 NOTE — DISCUSSION/SUMMARY
[Patient] : the patient [___ Week(s)] : in [unfilled] week(s) [FreeTextEntry1] : \par 70 y/o female with h/o htn, glaucoma, overweight, hematuria who presents for initial evaluation today for clearance for cataract surgery next week and htn\par \par -ordered CTA for cp\par -consider gi/surg eval for hiatal hernia given chest discomfort\par -ordered Echo for murmur, cp\par -ordered labs and workup for secondary cause htn\par -referral for sleep apnea eval\par -ordered ELIZA duplex for htn\par -ordered ekg - nsr, normal intervals, nsra\par -nutrition referral\par -continue norvasc/benazepril, start coreg 6.25 mg bid, dc labetalol, add hctz 25 mg qd\par -consider asa, statin pending CT\par -counseled on cvd risk factors\par -uro for w/up hematuria\par -cataract surgery to hold until bp controlled and CVD workup done\par -f/up 2-3 weeks for bp/bmp\par \par I have spent 60 minutes reviewing labs, records, tests and discussing bp, cvd risk factor control

## 2022-02-07 ENCOUNTER — RESULT REVIEW (OUTPATIENT)
Age: 72
End: 2022-02-07

## 2022-02-07 ENCOUNTER — APPOINTMENT (OUTPATIENT)
Dept: CT IMAGING | Facility: CLINIC | Age: 72
End: 2022-02-07
Payer: MEDICARE

## 2022-02-07 ENCOUNTER — APPOINTMENT (OUTPATIENT)
Dept: UROLOGY | Facility: CLINIC | Age: 72
End: 2022-02-07

## 2022-02-07 ENCOUNTER — APPOINTMENT (OUTPATIENT)
Dept: ULTRASOUND IMAGING | Facility: CLINIC | Age: 72
End: 2022-02-07
Payer: MEDICARE

## 2022-02-07 ENCOUNTER — OUTPATIENT (OUTPATIENT)
Dept: OUTPATIENT SERVICES | Facility: HOSPITAL | Age: 72
LOS: 1 days | End: 2022-02-07

## 2022-02-07 LAB
ALBUMIN SERPL ELPH-MCNC: 4.5 G/DL
ALDOSTERONE SERUM: 29.4 NG/DL
ALP BLD-CCNC: 82 U/L
ALT SERPL-CCNC: 15 U/L
ANION GAP SERPL CALC-SCNC: 13 MMOL/L
AST SERPL-CCNC: 23 U/L
BASOPHILS # BLD AUTO: 0.03 K/UL
BASOPHILS NFR BLD AUTO: 0.4 %
BILIRUB SERPL-MCNC: 0.3 MG/DL
BUN SERPL-MCNC: 22 MG/DL
CALCIUM SERPL-MCNC: 9.8 MG/DL
CALCIUM SERPL-MCNC: 9.9 MG/DL
CHLORIDE SERPL-SCNC: 94 MMOL/L
CHOLEST SERPL-MCNC: 204 MG/DL
CO2 SERPL-SCNC: 27 MMOL/L
CREAT SERPL-MCNC: 0.63 MG/DL
EOSINOPHIL # BLD AUTO: 0.04 K/UL
EOSINOPHIL NFR BLD AUTO: 0.5 %
ESTIMATED AVERAGE GLUCOSE: 126 MG/DL
GLUCOSE SERPL-MCNC: 75 MG/DL
HBA1C MFR BLD HPLC: 6 %
HCT VFR BLD CALC: 38.6 %
HDLC SERPL-MCNC: 84 MG/DL
HGB BLD-MCNC: 12.3 G/DL
IMM GRANULOCYTES NFR BLD AUTO: 0.4 %
LDLC SERPL CALC-MCNC: 107 MG/DL
LYMPHOCYTES # BLD AUTO: 1.61 K/UL
LYMPHOCYTES NFR BLD AUTO: 22 %
MAGNESIUM SERPL-MCNC: 2.2 MG/DL
MAN DIFF?: NORMAL
MCHC RBC-ENTMCNC: 28.5 PG
MCHC RBC-ENTMCNC: 31.9 GM/DL
MCV RBC AUTO: 89.6 FL
MONOCYTES # BLD AUTO: 0.58 K/UL
MONOCYTES NFR BLD AUTO: 7.9 %
NEUTROPHILS # BLD AUTO: 5.03 K/UL
NEUTROPHILS NFR BLD AUTO: 68.8 %
NONHDLC SERPL-MCNC: 119 MG/DL
PARATHYROID HORMONE INTACT: 82 PG/ML
PLATELET # BLD AUTO: 213 K/UL
POTASSIUM SERPL-SCNC: 4.6 MMOL/L
PROT SERPL-MCNC: 7.6 G/DL
RBC # BLD: 4.31 M/UL
RBC # FLD: 13.6 %
RENIN PLASMA: 221.2 PG/ML
SODIUM SERPL-SCNC: 135 MMOL/L
TRIGL SERPL-MCNC: 62 MG/DL
TSH SERPL-ACNC: 2.05 UIU/ML
WBC # FLD AUTO: 7.32 K/UL

## 2022-02-07 PROCEDURE — 93976 VASCULAR STUDY: CPT | Mod: 26

## 2022-02-07 PROCEDURE — 75574 CT ANGIO HRT W/3D IMAGE: CPT | Mod: 26

## 2022-02-07 PROCEDURE — 76770 US EXAM ABDO BACK WALL COMP: CPT | Mod: 26,59

## 2022-02-09 ENCOUNTER — APPOINTMENT (OUTPATIENT)
Dept: OPHTHALMOLOGY | Facility: AMBULATORY SURGERY CENTER | Age: 72
End: 2022-02-09

## 2022-02-09 LAB
CORTICOSTEROID BIND GLOBULIN: 2.6 MG/DL
CORTIS SERPL-MCNC: 10 UG/DL
CORTISOL, FREE: 0.42 UG/DL
PFCX: 4.2 %
RENIN ACTIVITY, PLASMA: 15.71 NG/ML/HR

## 2022-02-10 ENCOUNTER — APPOINTMENT (OUTPATIENT)
Dept: OPHTHALMOLOGY | Facility: CLINIC | Age: 72
End: 2022-02-10

## 2022-02-10 ENCOUNTER — RX RENEWAL (OUTPATIENT)
Age: 72
End: 2022-02-10

## 2022-02-10 RX ORDER — AMLODIPINE BESYLATE AND BENAZEPRIL HYDROCHLORIDE 10; 40 MG/1; MG/1
10-40 CAPSULE ORAL
Refills: 0 | Status: DISCONTINUED | COMMUNITY
End: 2022-02-10

## 2022-02-17 ENCOUNTER — APPOINTMENT (OUTPATIENT)
Dept: HEART AND VASCULAR | Facility: CLINIC | Age: 72
End: 2022-02-17
Payer: MEDICARE

## 2022-02-17 ENCOUNTER — RX RENEWAL (OUTPATIENT)
Age: 72
End: 2022-02-17

## 2022-02-17 VITALS
TEMPERATURE: 97.2 F | BODY MASS INDEX: 27.56 KG/M2 | WEIGHT: 146 LBS | HEART RATE: 69 BPM | HEIGHT: 61 IN | OXYGEN SATURATION: 97 % | DIASTOLIC BLOOD PRESSURE: 73 MMHG | SYSTOLIC BLOOD PRESSURE: 194 MMHG

## 2022-02-17 VITALS — SYSTOLIC BLOOD PRESSURE: 182 MMHG | DIASTOLIC BLOOD PRESSURE: 70 MMHG

## 2022-02-17 LAB
METANEPHRINE, PL: 46.3 PG/ML
NORMETANEPHRINE, PL: 216.8 PG/ML

## 2022-02-17 PROCEDURE — 36415 COLL VENOUS BLD VENIPUNCTURE: CPT

## 2022-02-17 PROCEDURE — 99214 OFFICE O/P EST MOD 30 MIN: CPT | Mod: 25

## 2022-02-17 PROCEDURE — 93306 TTE W/DOPPLER COMPLETE: CPT

## 2022-02-18 NOTE — HISTORY OF PRESENT ILLNESS
[FreeTextEntry1] : 72 y/o female with h/o htn, glaucoma, overweight, hematuria, cad, predm who presents for f/up today\par \par \par Last seen  for clearance for cataract surgery next week\par sent for CTA and Echo\par \par dc'd labetalol, started coreg, added hctz\par stated she dc'd norvasc 10 as she did not like side effects \par \par -workup for secondary cause htn (elevated PTH 82, wenceslao/renin elevated)\par -CTA cor : calcium score 11 (48%), cor's normal except RI mild calcification, mac, av calcification, moderate hiatal hernia, lung nodule RML - consider CT 6-12 months\par -ELIZA w doppler : no YANNI\par \par notes new cp in past few months\par notes some maria\par no syncope, lh, palpitations, edema, orthopnea, pnd\par \par notes snoring\par not exercising\par Had moderate hiatal hernia on CT abd/pelvis\par seen by rheum for elevated esr/funmilayo\par \par PMH/PSH:\par cad\par predm\par htn\par glaucoma\par funmilayo positive\par hematuria\par low vit D\par osteopenia\par thrombophilia\par gerd\par gout\par cataract\par overweight\par moderate hiatal hernia\par \par MEDS:\par benazepril 40\par coreg 6.25 bid\par hctz 25 qd\par prilosec\par \par \par ALL:\par epi\par \par \par SH:\par no tobacco, quit , former tobacco 20 years, 5-10 cigs/day\par no etoh/drugs\par from [place]\par lived [place] since \par retired - used to work for nonprofit \par \par no children\par \par \par FH:\par mother - heart disease 50's, htn,  91\par father - h/o prostate disease,  73\par siblings - htn\par

## 2022-02-18 NOTE — DISCUSSION/SUMMARY
[Patient] : the patient [___ Week(s)] : in [unfilled] week(s) [FreeTextEntry1] : 70 y/o female with h/o htn, glaucoma, overweight, hematuria, predm, cad who presents for f/up today\par \par -CTA cor 2/22: calcium score 11 (48%), cor's normal except RI mild calcification, mac, av calcification, moderate hiatal hernia, lung nodule RML - consider CT 6-12 months\par -ELIZA w doppler 2/22: no YANNI\par -consider gi/surg eval for hiatal hernia given chest discomfort\par -Echo 2/22\par 1. Mitral annular calcification, otherwise normal mitral valve. Mild mitral regurgitation. \par 2. Calcified trileaflet aortic valve with normal opening. Mild-moderate aortic regurgitation. Mild AS\par 3. Normal left ventricular systolic function with an LVEF of 65%.  No segmental wall motion abnormalities.\par 4. Normal right ventricular size and function.\par 5. Estimated pulmonary artery systolic pressure equals 41  mm Hg, assuming right atrial pressure equals 3 mm Hg, consistent with mild pulmonary pressures.\par 6. Normal pericardium with no pericardial effusion.\par -will plan for serial echo yearly to monitor valvular disease\par -close monitoring blood sugar given predm\par -labs 2022 reviewed and workup for secondary cause htn (elevated PTH 82, wenceslao/renin elevated)\par -referral to endo for elevated pth, predm\par -referral to renal given elevated wenceslao/renin\par -referral for sleep apnea eval\par -ekg 1/22 - nsr, normal intervals, nsra\par -nutrition referral\par -continue norvasc/benazepril,  coreg 6.25 mg bid,  hctz 25 mg qd\par -advised asa, statin given cad\par -consider asa given cad - she will decide\par -start lipitor 10 mg qhs\par -counseled on cvd risk factors\par -uro for w/up hematuria\par -ordered bmp, mg today\par -cataract surgery to hold until bp controlled and CVD workup done\par -f/up 2-3 weeks for bp \par \par I have spent 30 minutes reviewing labs, records, tests and discussing bp, cvd risk factor control. \par

## 2022-02-23 LAB
ANION GAP SERPL CALC-SCNC: 12 MMOL/L
BUN SERPL-MCNC: 18 MG/DL
CALCIUM SERPL-MCNC: 9.7 MG/DL
CHLORIDE SERPL-SCNC: 95 MMOL/L
CO2 SERPL-SCNC: 28 MMOL/L
CREAT SERPL-MCNC: 0.56 MG/DL
GLUCOSE SERPL-MCNC: 96 MG/DL
MAGNESIUM SERPL-MCNC: 2.3 MG/DL
POTASSIUM SERPL-SCNC: 4.1 MMOL/L
SODIUM SERPL-SCNC: 135 MMOL/L

## 2022-02-25 ENCOUNTER — APPOINTMENT (OUTPATIENT)
Dept: ORTHOPEDIC SURGERY | Facility: CLINIC | Age: 72
End: 2022-02-25

## 2022-02-28 ENCOUNTER — RX RENEWAL (OUTPATIENT)
Age: 72
End: 2022-02-28

## 2022-03-01 ENCOUNTER — APPOINTMENT (OUTPATIENT)
Dept: ORTHOPEDIC SURGERY | Facility: AMBULATORY SURGERY CENTER | Age: 72
End: 2022-03-01
Payer: MEDICARE

## 2022-03-01 PROCEDURE — 99213 OFFICE O/P EST LOW 20 MIN: CPT

## 2022-03-01 NOTE — HISTORY OF PRESENT ILLNESS
[de-identified] : 71-year-old comes in with pain in her right knee that started in November 2020 when she fell on her knee. \par The steroid last visit injection helped for a couple weeks.  Pain came back then.  She had done a lot of physical therapy last year and has continued to do the exercises on her own.\par she takes occasional Tylenol for pain. NSAIDs bother her stomach.  She has a history of reflux and cannot take NSAIDs.  Also her blood pressure has been consistently high and she is working on that\par Pain is  5/10\par She has to stop walking at about 2-3 blocks.  She walks with a cane.\par She uses knee support.

## 2022-03-01 NOTE — PHYSICAL EXAM
[LE] : Sensory: Intact in bilateral lower extremities [Normal RLE] : Right Lower Extremity: No scars, rashes, lesions, ulcers, skin intact [Normal LLE] : Left Lower Extremity: No scars, rashes, lesions, ulcers, skin intact [Normal Touch] : sensation intact for touch [Normal] : Oriented to person, place, and time, insight and judgement were intact and the affect was normal [de-identified] : Knees\par Mildly antalgic gait.  Varus alignment right greater than left knee\par No edema, ecchymoses, erythema.  No effusion of the knees.  No significant warmth.\par Knee range of motion is from few degree flexion contracture on the right to 120 degrees with crepitus and pain on full flexion in the right knee.  Left knee is from 0 to 130 degrees without pain.\par Normal varus and valgus laxity.  Ia Lachman.  Negative anterior and posterior drawer.\par Intact extensor mechanism.\par Tender medial joint line and patella facets right knee and minimally on the left.\par Nodules DIP and less PIP joints in the hands consistent with osteoarthritis of her hands [de-identified] : Overweight [de-identified] : \par X-rays of the knees weightbearing 4 views taken 1/21/22 showed severe degenerative change in the medial compartment of the right knee and mild in the left knee with joint space narrowing.  There is subchondral sclerosis.  Mild degeneration patellofemoral joint.\par \par August 9, 2021 MRI of the right knee at [facility] showed severe loss of cartilage and subchondral bone marrow edema in the medial femoral condyle and medial tibial plateau and extrusion of the meniscus consistent with severe degeneration.  Degeneration of the meniscus.  Mucoid degeneration of the ACL as well.  Degeneration lateral compartment as well.  Moderate joint effusion

## 2022-03-01 NOTE — ASSESSMENT
[FreeTextEntry1] : 71-year-old woman with severe right knee medial compartment osteoarthritis with possible evidence of prior avascular necrosis based on findings on the MRI. \par She is done all extensive physical therapy and taken medication and we did steroid injection with limited relief for just temporary relief.  I offered hyaluronic acid injection as a next step.  She is not at a point she would want to consider surgery.  She is trying to work on her blood pressure which has been consistently high and certainly would not do surgery until she works through that.\par We will order the hyaluronic acid injection for her as an option.  I discussed the injection.\par Follow-up when it is delivered or as needed.

## 2022-03-07 ENCOUNTER — APPOINTMENT (OUTPATIENT)
Dept: HEART AND VASCULAR | Facility: CLINIC | Age: 72
End: 2022-03-07
Payer: MEDICARE

## 2022-03-07 VITALS
WEIGHT: 146 LBS | BODY MASS INDEX: 27.56 KG/M2 | SYSTOLIC BLOOD PRESSURE: 160 MMHG | HEIGHT: 61 IN | TEMPERATURE: 97.3 F | OXYGEN SATURATION: 96 % | HEART RATE: 71 BPM | DIASTOLIC BLOOD PRESSURE: 70 MMHG

## 2022-03-07 PROCEDURE — 99214 OFFICE O/P EST MOD 30 MIN: CPT

## 2022-03-07 NOTE — HISTORY OF PRESENT ILLNESS
[FreeTextEntry1] : 70 y/o female with h/o htn, glaucoma, overweight, hematuria, cad, predm who presents for f/up today\par \par \par Last seen \par  \par started on lipitor\par c/o itching \par coreg increased to 25 bid but only taking once daily, norvasc decreased to 5 (did not tolerate 10) \par referred to endo for elevated pth, predm\par referred for marquis eval\par referred to renal for elevated renin/wenceslao\par \par not on asa yet.\par \par \par \par -workup for secondary cause htn (elevated PTH 82, wenceslao/renin elevated)\par -CTA cor : calcium score 11 (48%), cor's normal except RI mild calcification, mac, av calcification, moderate hiatal hernia, lung nodule RML - consider CT 6-12 months\par -ELIZA w doppler : no YANNI\par \par no cp, sob\par no syncope, lh, palpitations, edema, orthopnea, pnd\par \par notes snoring\par not exercising\par Had moderate hiatal hernia on CT abd/pelvis\par seen by rheum for elevated esr/funmilayo\par \par PMH/PSH:\par cad\par predm\par htn\par glaucoma\par funmilayo positive\par hematuria\par low vit D\par osteopenia\par thrombophilia\par gerd\par gout\par cataract\par overweight\par moderate hiatal hernia\par \par MEDS:\par benazepril 40\par coreg 25 qd\par hctz 25 qd\par norvasc 5 mg qd\par prilosec\par lipitor 10 mg qhs\par \par \par ALL:\par epi\par \par \par SH:\par no tobacco, quit , former tobacco 20 years, 5-10 cigs/day\par no etoh/drugs\par from [place]\par lived [place] since \par retired - used to work for nonprofit \par \par no children\par \par \par FH:\par mother - heart disease 50's, htn,  91\par father - h/o prostate disease,  73\par siblings - htn\par

## 2022-03-07 NOTE — DISCUSSION/SUMMARY
[Patient] : the patient [___ Month(s)] : in [unfilled] month(s) [FreeTextEntry1] : 70 y/o female with h/o htn, glaucoma, overweight, hematuria, predm, cad who presents for f/up today\par \par -CTA cor 2/22: calcium score 11 (48%), cor's normal except RI mild calcification, mac, av calcification, moderate hiatal hernia, lung nodule RML - consider CT 6-12 months\par -ELIZA w doppler 2/22: no YANNI\par -consider gi/surg eval for hiatal hernia given chest discomfort\par -Echo 2/22\par 1. Mitral annular calcification, otherwise normal mitral valve. Mild mitral regurgitation. \par 2. Calcified trileaflet aortic valve with normal opening. Mild-moderate aortic regurgitation. Mild AS\par 3. Normal left ventricular systolic function with an LVEF of 65%. No segmental wall motion abnormalities.\par 4. Normal right ventricular size and function.\par 5. Estimated pulmonary artery systolic pressure equals 41 mm Hg, assuming right atrial pressure equals 3 mm Hg, consistent with mild pulmonary pressures.\par 6. Normal pericardium with no pericardial effusion.\par -will plan for serial echo yearly to monitor valvular disease\par -close monitoring blood sugar given predm\par -labs 2022 reviewed and workup for secondary cause htn (elevated PTH 82, wenceslao/renin elevated)\par -referral to endo for elevated pth, predm\par -referral to renal given elevated wenceslao/renin\par -referral for sleep apnea eval\par -ekg 1/22 - nsr, normal intervals, nsra\par -nutrition referral\par -continue norvasc 5/benazepril 40\par -advised to take coreg 25 mg bid\par -continue hctz 25 mg qd\par -advised asa, statin given cad\par -consider asa given cad - she will decide\par -continue lipitor 10 mg qhs\par -counseled on cvd risk factors\par -uro for w/up hematuria\par -cataract surgery to hold until bp controlled and CVD workup done\par -f/up 1 month for bp\par \par I have spent 30 minutes reviewing labs, records, tests and discussing bp, cvd risk factor control. \par

## 2022-03-10 ENCOUNTER — RX RENEWAL (OUTPATIENT)
Age: 72
End: 2022-03-10

## 2022-03-17 ENCOUNTER — NON-APPOINTMENT (OUTPATIENT)
Age: 72
End: 2022-03-17

## 2022-03-17 ENCOUNTER — APPOINTMENT (OUTPATIENT)
Dept: NEPHROLOGY | Facility: CLINIC | Age: 72
End: 2022-03-17
Payer: MEDICARE

## 2022-03-17 VITALS
SYSTOLIC BLOOD PRESSURE: 156 MMHG | WEIGHT: 146 LBS | HEART RATE: 72 BPM | BODY MASS INDEX: 27.56 KG/M2 | HEIGHT: 61 IN | DIASTOLIC BLOOD PRESSURE: 68 MMHG

## 2022-03-17 PROCEDURE — 99204 OFFICE O/P NEW MOD 45 MIN: CPT

## 2022-03-17 NOTE — HISTORY OF PRESENT ILLNESS
[FreeTextEntry1] : 71-year-old woman with a 10-year history of hypertension which was relatively well controlled on losartan HCT for many years, but became quite elevated about 18 months ago.  She has been very thoroughly worked up by Dr. Roxane Montano.  Renal function has been normal with a creatinine of 0.56 and GFR of 94.  Renal ultrasound was normal and so was renal artery Doppler.  Plasma renin activity was quite elevated at 15 and so was serum aldosterone at 29.  Her K was 4.1 with a CO2 of 28.  CAT scan of the abdomen showed normal adrenals and a tiny left renal cyst.  She checks her BP at home and obtains high readings before medicine in the morning, like 170 systolic, but around 1 20-1 30 after medications.  She is currently on benazepril 40, carvedilol 25 twice daily, hydrochlorothiazide 25, and amlodipine 5 mg but she stopped that 3 days ago because she feels it causes insomnia.  Office BPs have been consistently running in the 200-2 10 systolic range.  Her A1c was 6.0, and her PTH was mildly elevated at 82 with a serum calcium of 9.7.  She will shortly be seeing Dr. Shabana Hanson for an endocrine evaluation regarding PTH.  She also was evaluated by Dr. Bustos urologically because of microscopic hematuria.

## 2022-03-17 NOTE — ASSESSMENT
[FreeTextEntry1] : 71-year-old woman with recent onset of extreme systolic BP elevation after years of good control with ARB/diuretic combo -it has been very challenging and her BP has remained elevated despite a 4 drug regimen including CCB, diuretic, ACEI, and beta/alpha blocker.  Her kidneys appear normal on imaging and her renal function is perfectly normal.  She clearly has hyperaldosteronism but her PRA is not suppressed.  There is no evidence of secondary hyperparathyroidism with normal renal function, and her normal calcium makes primary hyper para seem unlikely as well.  PTH has been reported to be elevated with aldosteronomas, and even proposed as a means of differentiating between adenoma and hyperplasia. Gonzalez et al suggested in 2012 paper in Hypertension (AHA) that it could help select patients for adrenal vein sampling. Yolanda et al reported on hyperparathyroidism in patients with primary aldosteronism in [facility] in 2011.  She is anxious to simply get the BP down and minimize side effects like insomnia, and wants to be able to travel.  I have asked her to start spironolactone at 50 mg daily, which is certainly the best add-on drug for resistant hypertension as well as appropriate conservative therapy for hyperaldosteronism.  She will return in 3 to 4 weeks and will continue to check her BP at home.  She does not really want to stay on so many medications, but I asked her to remain on her current 3 until we see that her BP has responded and then I will try to taper other meds down.  She will see Dr. Hanson on April 18

## 2022-03-17 NOTE — CONSULT LETTER
[Dear  ___] : Dear  [unfilled], [Consult Letter:] : I had the pleasure of evaluating your patient, [unfilled]. [Please see my note below.] : Please see my note below. [Consult Closing:] : Thank you very much for allowing me to participate in the care of this patient.  If you have any questions, please do not hesitate to contact me. [Sincerely,] : Sincerely, [FreeTextEntry2] : Dr Roxane ratliff [FreeTextEntry1] : Nikolay Betts - thank you! Fabulous job in a very challenging pt. I hope to get SBP down, but the picture remains somewhat cloudy - she may have a small aldosteronoma missed by CT.  Best regards, Sorin Ellis [FreeTextEntry3] : Sincerely, \par \par Ifeanyi Ellis MD, FACP\par

## 2022-03-28 ENCOUNTER — RX RENEWAL (OUTPATIENT)
Age: 72
End: 2022-03-28

## 2022-04-04 ENCOUNTER — RX RENEWAL (OUTPATIENT)
Age: 72
End: 2022-04-04

## 2022-04-04 RX ORDER — BENAZEPRIL HYDROCHLORIDE 40 MG/1
40 TABLET, FILM COATED ORAL DAILY
Qty: 90 | Refills: 0 | Status: DISCONTINUED | COMMUNITY
Start: 2022-02-10 | End: 2022-04-04

## 2022-04-14 ENCOUNTER — APPOINTMENT (OUTPATIENT)
Dept: NEPHROLOGY | Facility: CLINIC | Age: 72
End: 2022-04-14
Payer: MEDICARE

## 2022-04-14 VITALS
BODY MASS INDEX: 27.56 KG/M2 | DIASTOLIC BLOOD PRESSURE: 70 MMHG | SYSTOLIC BLOOD PRESSURE: 164 MMHG | WEIGHT: 146 LBS | HEIGHT: 61 IN | HEART RATE: 76 BPM

## 2022-04-14 DIAGNOSIS — Z87.39 PERSONAL HISTORY OF OTHER DISEASES OF THE MUSCULOSKELETAL SYSTEM AND CONNECTIVE TISSUE: ICD-10-CM

## 2022-04-14 DIAGNOSIS — S92.302A FRACTURE OF UNSPECIFIED METATARSAL BONE(S), LEFT FOOT, INITIAL ENCOUNTER FOR CLOSED FRACTURE: ICD-10-CM

## 2022-04-14 DIAGNOSIS — Z78.9 OTHER SPECIFIED HEALTH STATUS: ICD-10-CM

## 2022-04-14 DIAGNOSIS — Z82.49 FAMILY HISTORY OF ISCHEMIC HEART DISEASE AND OTHER DISEASES OF THE CIRCULATORY SYSTEM: ICD-10-CM

## 2022-04-14 DIAGNOSIS — I10 ESSENTIAL (PRIMARY) HYPERTENSION: ICD-10-CM

## 2022-04-14 PROCEDURE — 99214 OFFICE O/P EST MOD 30 MIN: CPT

## 2022-04-14 NOTE — ASSESSMENT
[FreeTextEntry1] : 71-year-old woman with resistant hypertension, what appears to be secondary hyperaldosteronism , and a coronary calcium score of 11, roughly average for her age.  She has no cardiovascular symptoms or signs.  Her renal function is normal.  Addition of spironolactone a month ago here may or may not have benefited her BP.  Today's readings are high again, so I have ordered a 24-hour ABPM to ascertain whether this is whitecoat effect or real, and determine her dipping status as well.  If her BP actually is suboptimal.  I would consider replacing losartan with a more effective ARB, Edarbi.

## 2022-04-14 NOTE — HISTORY OF PRESENT ILLNESS
[FreeTextEntry1] : 71-year-old woman with a 10-year history of hypertension, which was controlled on losartan HCT for years, but became quite elevated about 18 months ago.  She was thoroughly worked up by Dr. Roxane Montano, including normal renal function with a creatinine of 0.56 and GFR of 94, normal renal ultrasound, normal renal artery Doppler.  Elevated PRA at 15 and aldosterone at 29, with a K of 4.1 and CO2 of 28.  Abdominal CAT scan showed normal adrenals and a tiny left renal cyst.  Her BP in the morning tends to be high but then drops on second and third reading, often from 160 down to 1 30-1 40, and evening BPs are as low as 110.  She is currently on 4 meds, including hydrochlorothiazide 25 mg daily, carvedilol 25 mg twice daily, losartan 50 mg daily, and spironolactone 50 mg daily. Bobby was added 1 month ago, and amlodipine was discontinued by her because of a perception of causing insomnia.  Dr. Hanson added losartan 50 mg daily.  Interestingly, her calcium has been consistently normal, but her PTH is slightly elevated.  There have been several papers indicating that PTH elevation is sometimes a biomarker favoring adenoma over hyperplasia and primary aldosteronism, but this does not appear to be primary.

## 2022-04-18 ENCOUNTER — APPOINTMENT (OUTPATIENT)
Dept: ENDOCRINOLOGY | Facility: CLINIC | Age: 72
End: 2022-04-18
Payer: MEDICARE

## 2022-04-18 VITALS
WEIGHT: 142 LBS | SYSTOLIC BLOOD PRESSURE: 172 MMHG | DIASTOLIC BLOOD PRESSURE: 75 MMHG | BODY MASS INDEX: 26.83 KG/M2 | HEART RATE: 58 BPM

## 2022-04-18 PROCEDURE — 99204 OFFICE O/P NEW MOD 45 MIN: CPT

## 2022-04-18 RX ORDER — OMEPRAZOLE MAGNESIUM 20 MG/1
20 CAPSULE, DELAYED RELEASE ORAL
Refills: 0 | Status: DISCONTINUED | COMMUNITY
End: 2022-04-18

## 2022-04-18 RX ORDER — HYALURONATE SODIUM, STABILIZED 60 MG/3 ML
60 SYRINGE (ML) INTRAARTICULAR
Qty: 1 | Refills: 0 | Status: DISCONTINUED | OUTPATIENT
Start: 2022-03-01 | End: 2022-04-18

## 2022-04-18 RX ORDER — AMLODIPINE BESYLATE 5 MG/1
5 TABLET ORAL DAILY
Qty: 90 | Refills: 0 | Status: DISCONTINUED | COMMUNITY
Start: 2022-02-17 | End: 2022-04-18

## 2022-04-18 RX ORDER — ATORVASTATIN CALCIUM 10 MG/1
10 TABLET, FILM COATED ORAL
Qty: 90 | Refills: 0 | Status: DISCONTINUED | COMMUNITY
Start: 2022-02-17 | End: 2022-04-18

## 2022-04-18 NOTE — HISTORY OF PRESENT ILLNESS
[FreeTextEntry1] : Ms. Duran is a 71 year-old woman with a history of hypertension presenting to establish care with me for elevated hemoglobin A1c and elevated parathyroid hormone. She is referred by Dr. Roxane Harris. Of note, she has not been taking all her prescribed medications, including amlodipine and atorvastatin; I advised she discuss with her other physicians. \par \par Elevated hemoglobin A1c. \par She was noted to have a HbA1c value of 6.0% in February 2022; no known previous history of elevated hemoglobin A1c. \par Paternal cousin with history of diabetes; no first degree relatives with diabetes.\par 24 hour diet recall: breakfast, boiled egg, Tristanian pancake with honey, cashews, coffee with almond milk; lunch, vegetable soup; dinner, fried fish, steamed vegetables with dressing; snack, usually dark chocolate or cookie; no juices/sodas/sweetened beverages\par Exercise: Some exercise while seated; otherwise limited due to osteoarthritis\par \par Elevated parathyroid hormone.\par She was noted to have an elevated PTH level of 82 pg/mL (normal: 15-65) in February 2022 with normal serum calcium. Renal function has been within range. 25-hydroxyvitamin D 62.0 ng/mL in November 2021.\par She has a remote history of kidney stone. Renal ultrasound in February 2022 without nephrolithiasis.\par She has a history of toe fracture in the setting of a fall. Bone density in June 2021 significant for T-scores of -2.2 at the lumbar spine, -1.5 at the femoral neck, and -1.1 at the total hip. \par She has supplemented almond milk in her coffee. She has rare cheese; no yogurt or milk. No calcium supplements.\par She has been taking ergocalciferol 50,000 intl units weekly since at least 2017.
details…

## 2022-04-18 NOTE — PHYSICAL EXAM
[Alert] : alert [Healthy Appearance] : healthy appearance [No Acute Distress] : no acute distress [Normal Sclera/Conjunctiva] : normal sclera/conjunctiva [Normal Hearing] : hearing was normal [No Neck Mass] : no neck mass was observed [No LAD] : no lymphadenopathy [Supple] : the neck was supple [Thyroid Not Enlarged] : the thyroid was not enlarged [No Accessory Muscle Use] : no accessory muscle use [Clear to Auscultation] : lungs were clear to auscultation bilaterally [Normal to Percussion] : lungs were normal to percussion [No Stigmata of Cushings Syndrome] : no stigmata of Cushings Syndrome [Normal Gait] : normal gait [Normal Insight/Judgement] : insight and judgment were intact [Kyphosis] : no kyphosis present [Acanthosis Nigricans] : no acanthosis nigricans [de-identified] : no moon facies, no supraclavicular fat pads

## 2022-04-18 NOTE — ASSESSMENT
[FreeTextEntry1] : Elevated hemoglobin A1c. We reviewed lifestyle modifications for glycemic control. We discussed referral to nutrition. \par Lifestyle modifications\par Referral to nutrition\par \par Elevated parathyroid hormone. Osteopenia. She was noted to have an elevated PTH value with normal serum calcium. Renal function has been within range. 25-hydroxyvitamin D robust. She has a remote history of kidney stone; recent imaging without nephrolithiasis. Bone density in June 2021 significant for T-scores of -2.2 at the lumbar spine, -1.5 at the femoral neck, and -1.1 at the total hip. Her 10 year fracture risk calculated by [employer] was 15% for major osteoporotic fracture and 2.1% for hip fracture, below the treatment thresholds. Her borderline hyperparathyroidism is likely secondary to low calcium intake. I advised optimization of calcium intake as below and we can monitor next visit.\par Calcium 0153-4009 mg daily from diet and supplements (to be taken in divided doses as no more than 500-600 mg can be absorbed at one time); advised increased dietary calcium and/or calcium citrate supplementation\par Continue current vitamin D regimen\par \par Return to see me in 6 months.

## 2022-04-25 ENCOUNTER — NON-APPOINTMENT (OUTPATIENT)
Age: 72
End: 2022-04-25

## 2022-04-28 ENCOUNTER — RX RENEWAL (OUTPATIENT)
Age: 72
End: 2022-04-28

## 2022-04-28 ENCOUNTER — APPOINTMENT (OUTPATIENT)
Dept: PULMONOLOGY | Facility: CLINIC | Age: 72
End: 2022-04-28
Payer: MEDICARE

## 2022-04-28 VITALS
BODY MASS INDEX: 26.81 KG/M2 | HEART RATE: 61 BPM | WEIGHT: 142 LBS | HEIGHT: 61 IN | TEMPERATURE: 98 F | OXYGEN SATURATION: 98 % | SYSTOLIC BLOOD PRESSURE: 163 MMHG | DIASTOLIC BLOOD PRESSURE: 81 MMHG

## 2022-04-28 DIAGNOSIS — R06.81 APNEA, NOT ELSEWHERE CLASSIFIED: ICD-10-CM

## 2022-04-28 PROCEDURE — 99204 OFFICE O/P NEW MOD 45 MIN: CPT

## 2022-04-28 NOTE — PHYSICAL EXAM
[General Appearance - In No Acute Distress] : no acute distress [Normal Oropharynx] : normal oropharynx [Neck Cervical Mass (___cm)] : no neck mass was observed [Jugular Venous Distention Increased] : there was no jugular-venous distention [Apical Impulse] : the apical impulse was normal [Heart Sounds] : normal S1 and S2 [] : no respiratory distress [Exaggerated Use Of Accessory Muscles For Inspiration] : no accessory muscle use [Abnormal Walk] : normal gait [Cranial Nerves] : cranial nerves 2-12 were intact [No Focal Deficits] : no focal deficits [Oriented To Time, Place, And Person] : oriented to person, place, and time [Affect] : the affect was normal

## 2022-05-02 NOTE — ASSESSMENT
[FreeTextEntry1] : 70 y/o F with c/o snoring and witness apnea, uncontrolled HTN who was referred by her cardiologist for initial evaluation of possible sleep apnea.

## 2022-05-02 NOTE — REVIEW OF SYSTEMS
[Recent Wt Loss (___ Lbs)] : recent [unfilled] ~Ulb weight loss [Snoring] : snoring [Witnessed Apneas] : witnessed apnea [Obesity] : obesity [Heartburn] : heartburn [Negative] : Psychiatric [FreeTextEntry9] : CAD [de-identified] : pre-DM

## 2022-05-02 NOTE — HISTORY OF PRESENT ILLNESS
[FreeTextEntry1] : 04/28/2022 :  LUIS DANIEL PENA is a 71 year old female with PMHx glaucoma, overweight, CAD, pr- DM, GERD who presents for initial evaluation for possible sleep apnea.  \par \par She was referred by her cardiologist due to uncontrolled HTN. She reports occasional snoring and witness apnea which was noticed by a friend who slept over once. She  lost ~10lb since Feb 2022 and reports dry mouth in AM.\par \par Sleep Routine:\par \par She goes to bed at 11P, sleep latency is about 5 min, she wakes up once, WASO is about 10 min, and then she is up at 9-8. She does not nap . Her ESS is not recorded. \par \par \par \par She denies cataplexy, RLS, parasomnia, or any other sleep behavioral issues.

## 2022-05-05 ENCOUNTER — APPOINTMENT (OUTPATIENT)
Dept: HEART AND VASCULAR | Facility: CLINIC | Age: 72
End: 2022-05-05
Payer: MEDICARE

## 2022-05-05 ENCOUNTER — RX RENEWAL (OUTPATIENT)
Age: 72
End: 2022-05-05

## 2022-05-05 VITALS
HEART RATE: 59 BPM | TEMPERATURE: 97.3 F | BODY MASS INDEX: 27.19 KG/M2 | SYSTOLIC BLOOD PRESSURE: 176 MMHG | HEIGHT: 61 IN | WEIGHT: 144 LBS | DIASTOLIC BLOOD PRESSURE: 77 MMHG

## 2022-05-05 VITALS — SYSTOLIC BLOOD PRESSURE: 159 MMHG | DIASTOLIC BLOOD PRESSURE: 74 MMHG

## 2022-05-05 PROCEDURE — 36415 COLL VENOUS BLD VENIPUNCTURE: CPT

## 2022-05-05 PROCEDURE — 99214 OFFICE O/P EST MOD 30 MIN: CPT | Mod: 25

## 2022-05-05 RX ORDER — SPIRONOLACTONE 50 MG/1
50 TABLET ORAL DAILY
Qty: 90 | Refills: 2 | Status: DISCONTINUED | COMMUNITY
Start: 2022-03-17 | End: 2022-05-05

## 2022-05-05 RX ORDER — HYDROCHLOROTHIAZIDE 25 MG/1
25 TABLET ORAL DAILY
Qty: 90 | Refills: 0 | Status: DISCONTINUED | COMMUNITY
Start: 2022-01-31 | End: 2022-05-05

## 2022-05-05 NOTE — HISTORY OF PRESENT ILLNESS
[FreeTextEntry1] : 72 y/o female with h/o htn, glaucoma, overweight, hematuria, cad, predm who presents for f/up today\par \par \par Last seen 3/22\par BP monitor : avg 118/51 \par \par  on coreg, losartan, spironolactone, hctz\par did not tolerate norvasc\par referred to endo for elevated pth, predm\par referred for marquis eval -pending\par referred to renal for elevated renin/wenceslao\par \par not on asa yet or statin although has cad\par \par \par \par -workup for secondary cause htn (elevated PTH 82, wenceslao/renin elevated)\par -CTA cor : calcium score 11 (48%), cor's normal except RI mild calcification, mac, av calcification, moderate hiatal hernia, lung nodule RML - consider CT 6-12 months\par -ELIZA w doppler : no YANNI\par \par no cp, sob\par no syncope, lh, palpitations, edema, orthopnea, pnd\par \par notes snoring\par not exercising\par Had moderate hiatal hernia on CT abd/pelvis\par seen by rheum for elevated esr/funmilayo\par \par PMH/PSH:\par cad\par predm\par htn\par glaucoma\par funmilayo positive\par hematuria\par low vit D\par osteopenia\par thrombophilia\par gerd\par gout\par cataract\par overweight\par moderate hiatal hernia\par \par MEDS:\par calcium\par losartan 50 mg qd\par spironolactone 25 mg qd\par hctz 12.5 mg qd\par coreg 25 qd\par vit d3\par prilosec\par  \par \par \par ALL:\par epi\par \par \par SH:\par no tobacco, quit , former tobacco 20 years, 5-10 cigs/day\par no etoh/drugs\par from [place]\par lived [place] since \par retired - used to work for nonprofit \par \par no children\par \par \par FH:\par mother - heart disease 50's, htn,  91\par father - h/o prostate disease,  73\par siblings - htn\par

## 2022-05-05 NOTE — DISCUSSION/SUMMARY
[Patient] : the patient [___ Month(s)] : in [unfilled] month(s) [FreeTextEntry1] : 72 y/o female with h/o htn, glaucoma, overweight, hematuria, predm, cad who presents for f/up today\par \par -BP monitor 4/22: avg 118/51 \par -CTA cor 2/22: calcium score 11 (48%), cor's normal except RI mild calcification, mac, av calcification, moderate hiatal hernia, lung nodule RML - consider CT 6-12 months\par -ELIZA w doppler 2/22: no YANNI\par -consider gi/surg eval for hiatal hernia given chest discomfort\par -Echo 2/22\par 1. Mitral annular calcification, otherwise normal mitral valve. Mild mitral regurgitation. \par 2. Calcified trileaflet aortic valve with normal opening. Mild-moderate aortic regurgitation. Mild AS\par 3. Normal left ventricular systolic function with an LVEF of 65%. No segmental wall motion abnormalities.\par 4. Normal right ventricular size and function.\par 5. Estimated pulmonary artery systolic pressure equals 41 mm Hg, assuming right atrial pressure equals 3 mm Hg, consistent with mild pulmonary pressures.\par 6. Normal pericardium with no pericardial effusion.\par -will plan for serial echo yearly to monitor valvular disease\par -close monitoring blood sugar given predm\par -labs 2022 reviewed and workup for secondary cause htn (elevated PTH 82, wenceslao/renin elevated)\par -referral to endo for elevated pth, predm\par -referral to renal given elevated wenceslao/renin\par -referral for sleep apnea eval\par -ekg 1/22 - nsr, normal intervals, nsra\par -nutrition referral\par -continue coreg, losartan, hctz, jordy\par -advised asa if OK with urology given hematuria, statin given cad\par -counseled on cvd risk factors\par -uro for w/up hematuria\par -ordered bmp today\par -cataract surgery to hold until bp controlled and CVD workup done\par -f/up 3 month for bp and lipids\par \par I have spent 30 minutes reviewing labs, records, tests and discussing bp, cvd risk factor control. \par  \par \par

## 2022-05-06 LAB
ANION GAP SERPL CALC-SCNC: 12 MMOL/L
BUN SERPL-MCNC: 28 MG/DL
CALCIUM SERPL-MCNC: 9.7 MG/DL
CHLORIDE SERPL-SCNC: 98 MMOL/L
CO2 SERPL-SCNC: 25 MMOL/L
CREAT SERPL-MCNC: 0.75 MG/DL
EGFR: 86 ML/MIN/1.73M2
GLUCOSE SERPL-MCNC: 110 MG/DL
POTASSIUM SERPL-SCNC: 4.3 MMOL/L
SODIUM SERPL-SCNC: 135 MMOL/L

## 2022-05-16 ENCOUNTER — APPOINTMENT (OUTPATIENT)
Dept: UROLOGY | Facility: CLINIC | Age: 72
End: 2022-05-16
Payer: MEDICARE

## 2022-05-16 VITALS — DIASTOLIC BLOOD PRESSURE: 82 MMHG | SYSTOLIC BLOOD PRESSURE: 177 MMHG | HEART RATE: 59 BPM | TEMPERATURE: 97.3 F

## 2022-05-16 DIAGNOSIS — R31.29 OTHER MICROSCOPIC HEMATURIA: ICD-10-CM

## 2022-05-16 PROCEDURE — 52000 CYSTOURETHROSCOPY: CPT

## 2022-05-17 ENCOUNTER — NON-APPOINTMENT (OUTPATIENT)
Age: 72
End: 2022-05-17

## 2022-05-17 ENCOUNTER — APPOINTMENT (OUTPATIENT)
Dept: OBGYN | Facility: CLINIC | Age: 72
End: 2022-05-17
Payer: MEDICARE

## 2022-05-17 VITALS
WEIGHT: 142 LBS | TEMPERATURE: 98.7 F | OXYGEN SATURATION: 100 % | SYSTOLIC BLOOD PRESSURE: 156 MMHG | BODY MASS INDEX: 26.81 KG/M2 | HEART RATE: 62 BPM | DIASTOLIC BLOOD PRESSURE: 82 MMHG | HEIGHT: 61 IN

## 2022-05-17 DIAGNOSIS — N76.1 SUBACUTE AND CHRONIC VAGINITIS: ICD-10-CM

## 2022-05-17 DIAGNOSIS — N95.0 POSTMENOPAUSAL BLEEDING: ICD-10-CM

## 2022-05-17 PROCEDURE — 99387 INIT PM E/M NEW PAT 65+ YRS: CPT

## 2022-05-17 RX ORDER — UBIDECARENONE 30 MG
81 CAPSULE ORAL
Qty: 30 | Refills: 6 | Status: DISCONTINUED | COMMUNITY
Start: 2022-05-05 | End: 2022-05-17

## 2022-05-17 NOTE — PHYSICAL EXAM
[Chaperone Present] : A chaperone was present in the examining room during all aspects of the physical examination [Appropriately responsive] : appropriately responsive [Alert] : alert [No Acute Distress] : no acute distress [No Lymphadenopathy] : no lymphadenopathy [Soft] : soft [Non-tender] : non-tender [Non-distended] : non-distended [No HSM] : No HSM [No Lesions] : no lesions [No Mass] : no mass [Oriented x3] : oriented x3 [Examination Of The Breasts] : a normal appearance [No Masses] : no breast masses were palpable [Vulvar Atrophy] : vulvar atrophy [Labia Majora] : normal [Labia Minora] : normal [Urethral Caruncle] : urethral caruncle [Atrophy] : atrophy [No Bleeding] : There was no active vaginal bleeding [Normal] : normal [Uterine Adnexae] : non-palpable

## 2022-05-17 NOTE — PLAN
[FreeTextEntry1] : Vaginitis swab collected. If negative, recommend starting vaginal estrogen for atrophic vaginitis.

## 2022-05-17 NOTE — HISTORY OF PRESENT ILLNESS
[Y] : Positive pregnancy history [Post-Menopause, No Sxs] : post-menopausal, currently without symptoms [Menopause Age: ____] : age at menopause was [unfilled] [Previously active] : previously active [No] : No [postmenopausal] : postmenopausal [N] : Patient is not sexually active [PGxTotal] : 1 [FreeTextEntry1] : TOP x1

## 2022-05-19 ENCOUNTER — NON-APPOINTMENT (OUTPATIENT)
Age: 72
End: 2022-05-19

## 2022-05-19 ENCOUNTER — LABORATORY RESULT (OUTPATIENT)
Age: 72
End: 2022-05-19

## 2022-05-19 DIAGNOSIS — B37.3 CANDIDIASIS OF VULVA AND VAGINA: ICD-10-CM

## 2022-05-19 LAB
CANDIDA VAG CYTO: DETECTED
G VAGINALIS+PREV SP MTYP VAG QL MICRO: NOT DETECTED
HPV HIGH+LOW RISK DNA PNL CVX: NOT DETECTED
T VAGINALIS VAG QL WET PREP: NOT DETECTED

## 2022-05-20 LAB
APPEARANCE: CLEAR
BILIRUBIN URINE: NEGATIVE
BLOOD URINE: NORMAL
COLOR: COLORLESS
CYTOLOGY CVX/VAG DOC THIN PREP: NORMAL
GLUCOSE QUALITATIVE U: NEGATIVE
KETONES URINE: NEGATIVE
LEUKOCYTE ESTERASE URINE: ABNORMAL
NITRITE URINE: NEGATIVE
PH URINE: 6.5
PROTEIN URINE: NEGATIVE
SPECIFIC GRAVITY URINE: 1.01
UROBILINOGEN URINE: NORMAL

## 2022-05-24 ENCOUNTER — OUTPATIENT (OUTPATIENT)
Dept: OUTPATIENT SERVICES | Facility: HOSPITAL | Age: 72
LOS: 1 days | End: 2022-05-24
Payer: MEDICARE

## 2022-05-24 ENCOUNTER — APPOINTMENT (OUTPATIENT)
Dept: SLEEP CENTER | Facility: HOME HEALTH | Age: 72
End: 2022-05-24
Payer: MEDICARE

## 2022-05-24 LAB — BACTERIA UR CULT: NORMAL

## 2022-05-24 PROCEDURE — 95800 SLP STDY UNATTENDED: CPT

## 2022-05-24 PROCEDURE — 95800 SLP STDY UNATTENDED: CPT | Mod: 26

## 2022-05-25 DIAGNOSIS — G47.33 OBSTRUCTIVE SLEEP APNEA (ADULT) (PEDIATRIC): ICD-10-CM

## 2022-05-31 ENCOUNTER — APPOINTMENT (OUTPATIENT)
Dept: INTERNAL MEDICINE | Facility: CLINIC | Age: 72
End: 2022-05-31
Payer: MEDICARE

## 2022-05-31 ENCOUNTER — LABORATORY RESULT (OUTPATIENT)
Age: 72
End: 2022-05-31

## 2022-05-31 VITALS
SYSTOLIC BLOOD PRESSURE: 153 MMHG | BODY MASS INDEX: 27 KG/M2 | WEIGHT: 143 LBS | OXYGEN SATURATION: 99 % | HEART RATE: 60 BPM | DIASTOLIC BLOOD PRESSURE: 67 MMHG | TEMPERATURE: 98.8 F | RESPIRATION RATE: 14 BRPM | HEIGHT: 61 IN

## 2022-05-31 DIAGNOSIS — Z23 ENCOUNTER FOR IMMUNIZATION: ICD-10-CM

## 2022-05-31 DIAGNOSIS — M17.12 UNILATERAL PRIMARY OSTEOARTHRITIS, LEFT KNEE: ICD-10-CM

## 2022-05-31 DIAGNOSIS — Z87.440 PERSONAL HISTORY OF URINARY (TRACT) INFECTIONS: ICD-10-CM

## 2022-05-31 DIAGNOSIS — E66.3 OVERWEIGHT: ICD-10-CM

## 2022-05-31 DIAGNOSIS — Z00.00 ENCOUNTER FOR GENERAL ADULT MEDICAL EXAMINATION W/OUT ABNORMAL FINDINGS: ICD-10-CM

## 2022-05-31 DIAGNOSIS — I10 ESSENTIAL (PRIMARY) HYPERTENSION: ICD-10-CM

## 2022-05-31 PROCEDURE — 99387 INIT PM E/M NEW PAT 65+ YRS: CPT | Mod: 25

## 2022-05-31 PROCEDURE — 36415 COLL VENOUS BLD VENIPUNCTURE: CPT

## 2022-05-31 RX ORDER — FLUCONAZOLE 150 MG/1
150 TABLET ORAL DAILY
Qty: 3 | Refills: 3 | Status: COMPLETED | COMMUNITY
Start: 2022-05-19 | End: 2022-05-31

## 2022-05-31 RX ORDER — CARVEDILOL 6.25 MG/1
6.25 TABLET, FILM COATED ORAL
Qty: 60 | Refills: 0 | Status: COMPLETED | COMMUNITY
Start: 2022-01-31 | End: 2022-05-31

## 2022-05-31 RX ORDER — SULFAMETHOXAZOLE AND TRIMETHOPRIM 800; 160 MG/1; MG/1
800-160 TABLET ORAL TWICE DAILY
Qty: 14 | Refills: 0 | Status: COMPLETED | COMMUNITY
Start: 2022-05-19 | End: 2022-05-31

## 2022-05-31 NOTE — HISTORY OF PRESENT ILLNESS
[FreeTextEntry1] : annual exam/est care [de-identified] : HTN\par - concerned about multiple medications\par - does not take medication as directed\par - takes carvedilol 25mg daily instead of BID\par - does not take HCTZ b/c causes increased urination\par - does not take spironolactone 25mg b/c causes stomach upset\par \par HLD\par - does not take atorvastatin, feels it is not needed\par \par UTI\par - never took bactrim or fluconazole rx'ed by gyn/uro\par - states sx resolved on their own\par \par HCM\par - mammo scheduled this monday\par - c scope due 2025\par - completed covid vax caused her to have persistent white mucus\par - influenza, denied states she had reaction in the past\par - pna vax, never, denied

## 2022-05-31 NOTE — PHYSICAL EXAM
[No Acute Distress] : no acute distress [Well Nourished] : well nourished [Well Developed] : well developed [Well-Appearing] : well-appearing [Normal Sclera/Conjunctiva] : normal sclera/conjunctiva [EOMI] : extraocular movements intact [Normal Outer Ear/Nose] : the outer ears and nose were normal in appearance [Thyroid Normal, No Nodules] : the thyroid was normal and there were no nodules present [No Respiratory Distress] : no respiratory distress  [No Accessory Muscle Use] : no accessory muscle use [Clear to Auscultation] : lungs were clear to auscultation bilaterally [Normal Rate] : normal rate  [Regular Rhythm] : with a regular rhythm [Normal S1, S2] : normal S1 and S2 [No Carotid Bruits] : no carotid bruits [No Abdominal Bruit] : a ~M bruit was not heard ~T in the abdomen [No Varicosities] : no varicosities [Pedal Pulses Present] : the pedal pulses are present [No Edema] : there was no peripheral edema [No Extremity Clubbing/Cyanosis] : no extremity clubbing/cyanosis [No HSM] : no HSM [Normal Posterior Cervical Nodes] : no posterior cervical lymphadenopathy [Normal Anterior Cervical Nodes] : no anterior cervical lymphadenopathy [Grossly Normal Strength/Tone] : grossly normal strength/tone [No Rash] : no rash [Coordination Grossly Intact] : coordination grossly intact [No Focal Deficits] : no focal deficits [Normal Gait] : normal gait [Normal Affect] : the affect was normal [Alert and Oriented x3] : oriented to person, place, and time [Normal Mood] : the mood was normal [Normal Insight/Judgement] : insight and judgment were intact

## 2022-06-06 ENCOUNTER — NON-APPOINTMENT (OUTPATIENT)
Age: 72
End: 2022-06-06

## 2022-06-06 ENCOUNTER — APPOINTMENT (OUTPATIENT)
Dept: OPHTHALMOLOGY | Facility: CLINIC | Age: 72
End: 2022-06-06
Payer: MEDICARE

## 2022-06-06 LAB
ALBUMIN SERPL ELPH-MCNC: 4 G/DL
ALP BLD-CCNC: 69 U/L
ALT SERPL-CCNC: 40 U/L
ANION GAP SERPL CALC-SCNC: 12 MMOL/L
AST SERPL-CCNC: 35 U/L
BILIRUB SERPL-MCNC: 0.2 MG/DL
BUN SERPL-MCNC: 24 MG/DL
CALCIUM SERPL-MCNC: 9.4 MG/DL
CHLORIDE SERPL-SCNC: 100 MMOL/L
CHOLEST SERPL-MCNC: 142 MG/DL
CO2 SERPL-SCNC: 24 MMOL/L
CREAT SERPL-MCNC: 0.65 MG/DL
EGFR: 95 ML/MIN/1.73M2
ESTIMATED AVERAGE GLUCOSE: 134 MG/DL
GLUCOSE SERPL-MCNC: 101 MG/DL
HBA1C MFR BLD HPLC: 6.3 %
HCV AB SER QL: NONREACTIVE
HCV S/CO RATIO: 0.15 S/CO
HDLC SERPL-MCNC: 63 MG/DL
LDLC SERPL CALC-MCNC: 69 MG/DL
NONHDLC SERPL-MCNC: 79 MG/DL
POTASSIUM SERPL-SCNC: 4.6 MMOL/L
PROT SERPL-MCNC: 6.7 G/DL
SODIUM SERPL-SCNC: 137 MMOL/L
TRIGL SERPL-MCNC: 50 MG/DL

## 2022-06-06 PROCEDURE — 92014 COMPRE OPH EXAM EST PT 1/>: CPT

## 2022-06-07 ENCOUNTER — RX RENEWAL (OUTPATIENT)
Age: 72
End: 2022-06-07

## 2022-06-07 ENCOUNTER — TRANSCRIPTION ENCOUNTER (OUTPATIENT)
Age: 72
End: 2022-06-07

## 2022-06-14 ENCOUNTER — NON-APPOINTMENT (OUTPATIENT)
Age: 72
End: 2022-06-14

## 2022-06-14 ENCOUNTER — APPOINTMENT (OUTPATIENT)
Dept: PULMONOLOGY | Facility: CLINIC | Age: 72
End: 2022-06-14
Payer: MEDICARE

## 2022-06-14 DIAGNOSIS — R06.83 SNORING: ICD-10-CM

## 2022-06-14 PROCEDURE — 99212 OFFICE O/P EST SF 10 MIN: CPT | Mod: 95

## 2022-06-14 NOTE — REASON FOR VISIT
[Home] : at home, [unfilled] , at the time of the visit. [Medical Office: ([facility])___] : at the medical office located in

## 2022-06-14 NOTE — HISTORY OF PRESENT ILLNESS
[FreeTextEntry1] : 6/14/22: reviewed recent unattended home sleep testing  with patient- referred because of snoring and witnessed apnea. No real excessive daytime somnolence. Study from 5/24/22 showed insignificant sleep disordered breathing with Apnea Hypopnea Index 4.1 (4%), 6h 34m sleep time

## 2022-06-14 NOTE — ASSESSMENT
[FreeTextEntry1] : Reassured, clinically insignificant obstructive sleep apnea.  Advised  to maintain a healthy weight and avoid alcohol (does not use) or other sedating medications close to bedtime

## 2022-06-30 ENCOUNTER — RX RENEWAL (OUTPATIENT)
Age: 72
End: 2022-06-30

## 2022-07-19 PROBLEM — H40.003 GLAUCOMA SUSPECT OF BOTH EYES: Status: ACTIVE | Noted: 2018-03-23

## 2022-09-16 ENCOUNTER — RX RENEWAL (OUTPATIENT)
Age: 72
End: 2022-09-16

## 2022-12-15 ENCOUNTER — RX RENEWAL (OUTPATIENT)
Age: 72
End: 2022-12-15

## 2023-03-13 NOTE — ED ADULT NURSE NOTE - SUICIDE SCREENING DEPRESSION
Returned call to pt. Left vm needs to be done at [facility]. Rescheduled for 03/16/23 at 12 noon. Left clinic number to return call to confirm.    Negative

## 2023-03-21 ENCOUNTER — APPOINTMENT (OUTPATIENT)
Dept: OPHTHALMOLOGY | Facility: CLINIC | Age: 73
End: 2023-03-21
Payer: MEDICARE

## 2023-03-21 ENCOUNTER — NON-APPOINTMENT (OUTPATIENT)
Age: 73
End: 2023-03-21

## 2023-03-21 PROCEDURE — 92014 COMPRE OPH EXAM EST PT 1/>: CPT

## 2023-04-03 ENCOUNTER — APPOINTMENT (OUTPATIENT)
Dept: ORTHOPEDIC SURGERY | Facility: CLINIC | Age: 73
End: 2023-04-03
Payer: MEDICARE

## 2023-04-03 DIAGNOSIS — M17.11 UNILATERAL PRIMARY OSTEOARTHRITIS, RIGHT KNEE: ICD-10-CM

## 2023-04-03 PROCEDURE — 99214 OFFICE O/P EST MOD 30 MIN: CPT

## 2023-04-03 PROCEDURE — 73564 X-RAY EXAM KNEE 4 OR MORE: CPT | Mod: 50

## 2023-04-03 PROCEDURE — 72170 X-RAY EXAM OF PELVIS: CPT

## 2023-04-03 NOTE — HISTORY OF PRESENT ILLNESS
[de-identified] : 72-year-old comes in with pain in her right knee that started in November 2020 when she fell on her knee.  She was last seen in March 2022.\par She had steroid injection last year that was not helpful. She was in [place] in November/December and had a round of HA injections that also did not help. \par Her knee is feeling worse..  Pain occurs primarily with walking and she can only walk about 100 feet without having to stop.\par She is using a cane.\par She has not really wanted to consider knee replacement\par She tries not to take medication for pain\par She had seen a chiropractor who thought her pain might be coming from her hip.  For the most part the pains in the knee but sometimes her hip does bother her.

## 2023-04-03 NOTE — PHYSICAL EXAM
[LE] : Sensory: Intact in bilateral lower extremities [Normal RLE] : Right Lower Extremity: No scars, rashes, lesions, ulcers, skin intact [Normal LLE] : Left Lower Extremity: No scars, rashes, lesions, ulcers, skin intact [Normal Touch] : sensation intact for touch [Normal] : Oriented to person, place, and time, insight and judgement were intact and the affect was normal [DP] : dorsalis pedis 2+ and symmetric bilaterally [PT] : posterior tibial 2+ and symmetric bilaterally [de-identified] : Knees\par Mildly antalgic gait.  Varus alignment right greater than left knee\par No edema, ecchymoses, erythema.  No effusion of the knees.  No significant warmth.\par Knee range of motion is from 5 degree flexion contracture on the right to 120 degrees with mild crepitus and pain on full flexion in the right knee.  Left knee is from 0 to 130 degrees without pain.\par Normal varus and valgus laxity.  IA Lachman.  Negative anterior and posterior drawer.\par Intact extensor mechanism.\par Tender medial joint line and patella facets right knee and minimally on the left.\par Right hip range of motion is good with 135 degrees flexion and 10 degrees internal rotation and 45 to 50 degrees of external rotation.\par NVI distally [de-identified] : Overweight [de-identified] : \par X-rays of the knees weightbearing 4 views taken today 4/3/23 compared to 1/21/22 showed progressive tricompartmental osteoarthritis, severe degenerative change in the medial compartment of the right knee and mild in the left knee with joint space narrowing.  There is subchondral sclerosis.  Mild to moderate degenerative changes patellofemoral and lateral compartments right knee\par \par August 9, 2021 MRI of the right knee at [facility] showed severe loss of cartilage and subchondral bone marrow edema in the medial femoral condyle and medial tibial plateau and extrusion of the meniscus consistent with severe degeneration.  Degeneration of the meniscus.  Mucoid degeneration of the ACL as well.  Degeneration lateral compartment as well.  Moderate joint effusion\par \par AP pelvic x-ray shows no significant hip joint arthritis.

## 2023-04-03 NOTE — ASSESSMENT
[FreeTextEntry1] : 72-year-old woman with severe progressive right knee osteoarthritis progressing and chronically painful not alleviated by any injections including HA and steroids.  She did put on a little weight and is working on losing it.  I encouraged her to do her home exercises.  She asked about other injections and we talked about PRP but I am really not sure it would make any difference certainly for not an extended period of time.  I talked her about knee replacement surgery.  I suggested she see someone and talk to them more about it.  She can take Tylenol 2 tablets twice a day for pain.  NSAIDs bother her stomach so she will try to keep that to a minimum

## 2023-04-16 ENCOUNTER — RESULT CHARGE (OUTPATIENT)
Age: 73
End: 2023-04-16

## 2023-04-17 ENCOUNTER — APPOINTMENT (OUTPATIENT)
Dept: INTERNAL MEDICINE | Facility: CLINIC | Age: 73
End: 2023-04-17
Payer: MEDICARE

## 2023-04-17 ENCOUNTER — NON-APPOINTMENT (OUTPATIENT)
Age: 73
End: 2023-04-17

## 2023-04-17 VITALS — OXYGEN SATURATION: 98 % | TEMPERATURE: 97.7 F | RESPIRATION RATE: 14 BRPM

## 2023-04-17 VITALS — SYSTOLIC BLOOD PRESSURE: 169 MMHG | DIASTOLIC BLOOD PRESSURE: 74 MMHG

## 2023-04-17 VITALS
DIASTOLIC BLOOD PRESSURE: 60 MMHG | WEIGHT: 148 LBS | SYSTOLIC BLOOD PRESSURE: 125 MMHG | BODY MASS INDEX: 27.94 KG/M2 | HEART RATE: 56 BPM | HEIGHT: 61 IN

## 2023-04-17 VITALS — SYSTOLIC BLOOD PRESSURE: 157 MMHG | HEART RATE: 60 BPM | DIASTOLIC BLOOD PRESSURE: 72 MMHG

## 2023-04-17 DIAGNOSIS — H04.123 DRY EYE SYNDROME OF BILATERAL LACRIMAL GLANDS: ICD-10-CM

## 2023-04-17 PROCEDURE — 36415 COLL VENOUS BLD VENIPUNCTURE: CPT

## 2023-04-17 PROCEDURE — 93000 ELECTROCARDIOGRAM COMPLETE: CPT

## 2023-04-17 PROCEDURE — 99214 OFFICE O/P EST MOD 30 MIN: CPT | Mod: 25

## 2023-04-17 RX ORDER — ATORVASTATIN CALCIUM 10 MG/1
10 TABLET, FILM COATED ORAL
Qty: 90 | Refills: 0 | Status: COMPLETED | COMMUNITY
Start: 2022-05-05 | End: 2023-04-17

## 2023-04-18 ENCOUNTER — NON-APPOINTMENT (OUTPATIENT)
Age: 73
End: 2023-04-18

## 2023-04-18 LAB
ANION GAP SERPL CALC-SCNC: 11 MMOL/L
BUN SERPL-MCNC: 28 MG/DL
CALCIUM SERPL-MCNC: 10 MG/DL
CHLORIDE SERPL-SCNC: 98 MMOL/L
CO2 SERPL-SCNC: 29 MMOL/L
CREAT SERPL-MCNC: 0.67 MG/DL
EGFR: 93 ML/MIN/1.73M2
GLUCOSE SERPL-MCNC: 116 MG/DL
POTASSIUM SERPL-SCNC: 4.4 MMOL/L
SODIUM SERPL-SCNC: 137 MMOL/L

## 2023-04-18 NOTE — HISTORY OF PRESENT ILLNESS
[Aortic Stenosis] : aortic stenosis [Coronary Artery Disease] : coronary artery disease [No Pertinent Pulmonary History] : no history of asthma, COPD, sleep apnea, or smoking [No Adverse Anesthesia Reaction] : no adverse anesthesia reaction in self or family member [(Patient denies any chest pain, claudication, dyspnea on exertion, orthopnea, palpitations or syncope)] : Patient denies any chest pain, claudication, dyspnea on exertion, orthopnea, palpitations or syncope [Moderate (4-6 METs)] : Moderate (4-6 METs) [Atrial Fibrillation] : no atrial fibrillation [Recent Myocardial Infarction] : no recent myocardial infarction [Implantable Device/Pacemaker] : no implantable device/pacemaker [Chronic Anticoagulation] : no chronic anticoagulation [Chronic Kidney Disease] : no chronic kidney disease [Diabetes] : no diabetes [FreeTextEntry1] : L eye cataract surgery [FreeTextEntry2] : 5/10/23 [FreeTextEntry3] : Dr. Pearl [FreeTextEntry4] : Pt is a 17 y/o F with PMHx of HTN, cataracts, glaucoma, overweight, hematuria, prediabetes, CAD who presents to the office today for medical clearance for L eye cataract surgery on 5/10/23\par \par Pt reports she is feeling well [FreeTextEntry5] : mild AS on echo [FreeTextEntry7] : Echo 2/2022\par CT angio 2/22 Lcx 11\par EKG 4/17/23: sinus burke

## 2023-04-18 NOTE — PHYSICAL EXAM
[No Acute Distress] : no acute distress [Normal Sclera/Conjunctiva] : normal sclera/conjunctiva [Normal Outer Ear/Nose] : the outer ears and nose were normal in appearance [No JVD] : no jugular venous distention [No Edema] : there was no peripheral edema [Normal] : soft, non-tender, non-distended, no masses palpated, no HSM and normal bowel sounds [de-identified] : +2/6 murmur over RUSB and LLSB

## 2023-04-18 NOTE — ASSESSMENT
[High Risk Surgery - Intraperitoneal, Intrathoracic or Supringuinal Vascular Procedures] : High Risk Surgery - Intraperitoneal, Intrathoracic or Supringuinal Vascular Procedures - No (0) [Ischemic Heart Disease] : Ischemic Heart Disease  - Yes (1) [Congestive Heart Failure] : Congestive Heart Failure - No (0) [Prior Cerebrovascular Accident or TIA] : Prior Cerebrovascular Accident or TIA - No (0) [Creatinine >= 2mg/dL (1 Point)] : Creatinine >= 2mg/dL - No (0) [Insulin-dependent Diabetic (1 Point)] : Insulin-dependent Diabetic - No (0) [Patient Optimized for Surgery] : Patient optimized for surgery [No Further Testing Recommended] : no further testing recommended [Continue medications as is] : Continue current medications [As per surgery] : as per surgery [1] : 1 , RCRI Class: II, Risk of Post-Op Cardiac Complications: 6.0%, 95% CI for Risk Estimate: 4.9% - 7.4% [FreeTextEntry4] : Pt is MODERATE risk for LOW risk procedure. Pt may proceed with elective surgery.

## 2023-05-04 ENCOUNTER — APPOINTMENT (OUTPATIENT)
Dept: HEART AND VASCULAR | Facility: CLINIC | Age: 73
End: 2023-05-04
Payer: MEDICARE

## 2023-05-04 ENCOUNTER — RX RENEWAL (OUTPATIENT)
Age: 73
End: 2023-05-04

## 2023-05-04 VITALS
SYSTOLIC BLOOD PRESSURE: 172 MMHG | HEIGHT: 61 IN | DIASTOLIC BLOOD PRESSURE: 89 MMHG | TEMPERATURE: 96.5 F | HEART RATE: 54 BPM | BODY MASS INDEX: 27.94 KG/M2 | WEIGHT: 148 LBS | OXYGEN SATURATION: 99 %

## 2023-05-04 VITALS — DIASTOLIC BLOOD PRESSURE: 70 MMHG | SYSTOLIC BLOOD PRESSURE: 160 MMHG

## 2023-05-04 DIAGNOSIS — I35.1 NONRHEUMATIC AORTIC (VALVE) INSUFFICIENCY: ICD-10-CM

## 2023-05-04 PROCEDURE — 36415 COLL VENOUS BLD VENIPUNCTURE: CPT

## 2023-05-04 PROCEDURE — 99214 OFFICE O/P EST MOD 30 MIN: CPT | Mod: 25

## 2023-05-04 NOTE — DISCUSSION/SUMMARY
[Patient] : the patient [EKG obtained to assist in diagnosis and management of assessed problem(s)] : EKG obtained to assist in diagnosis and management of assessed problem(s) [___ Week(s)] : in [unfilled] week(s) [FreeTextEntry1] : 71 y/o female with h/o htn, glaucoma, overweight, hematuria, predm, cad who presents for f/up today\par \par -echo ordered today to f/up as/ai\par -BP monitor 4/22: avg 118/51 \par -CTA cor 2/22: calcium score 11 (48%), cor's normal except RI mild calcification, mac, av calcification, moderate hiatal hernia, lung nodule RML - consider CT 6-12 months\par -ELIZA w doppler 2/22: no YANNI\par -consider gi/surg eval for hiatal hernia given chest discomfort\par -Echo 2/22\par 1. Mitral annular calcification, otherwise normal mitral valve. Mild mitral regurgitation. \par 2. Calcified trileaflet aortic valve with normal opening. Mild-moderate aortic regurgitation. Mild AS\par 3. Normal left ventricular systolic function with an LVEF of 65%. No segmental wall motion abnormalities.\par 4. Normal right ventricular size and function.\par 5. Estimated pulmonary artery systolic pressure equals 41 mm Hg, assuming right atrial pressure equals 3 mm Hg, consistent with mild pulmonary pressures.\par 6. Normal pericardium with no pericardial effusion.\par -will plan for serial echo yearly to monitor valvular disease\par -close monitoring blood sugar given predm\par -labs 2022 reviewed and workup for secondary cause htn (elevated PTH 82, wenceslao/renin elevated)\par -referral to endo for elevated pth, predm\par -referral to renal given elevated wenceslao/renin\par -ekg 4/23 reviewed\par -continue coreg, losartan, hctz, jordy\par -increase to losartan 100\par -advised asa if OK with urology given hematuria, statin given cad\par -counseled on cvd risk factors\par -uro for w/up hematuria\par -f/up 3 weeks for bp, bmp\par \par I have spent 30 minutes reviewing labs, records, tests and discussing bp, cvd risk factor control. \par  \par

## 2023-05-04 NOTE — HISTORY OF PRESENT ILLNESS
[FreeTextEntry1] : 73 y/o female with h/o htn, glaucoma, overweight, hematuria, cad, predm who presents for f/up today\par and preop for cataract surgery\par \par \par Last seen \par BP monitor : avg 118/51 \par \par on coreg, losartan, spironolactone, hctz\par did not tolerate norvasc\par \par referred to endo for elevated pth, predm\par \par referred for marquis eval \par Study from 22 showed insignificant sleep disordered breathing with Apnea Hypopnea Index 4.1 (4%), 6h 34m sleep time \par \par referred to renal for elevated renin/wenceslao\par \par not on asa yet or statin although has cad\par \par -Echo \par 1. Mitral annular calcification, otherwise normal mitral valve. Mild mitral regurgitation. \par 2. Calcified trileaflet aortic valve with normal opening. Mild-moderate aortic regurgitation. Mild AS\par 3. Normal left ventricular systolic function with an LVEF of 65%. No segmental wall motion abnormalities.\par 4. Normal right ventricular size and function.\par 5. Estimated pulmonary artery systolic pressure equals 41 mm Hg, assuming right atrial pressure equals 3 mm Hg, consistent with mild pulmonary pressures.\par 6. Normal pericardium with no pericardial effusion.\par \par -workup for secondary cause htn (elevated PTH 82, wenceslao/renin elevated)\par \par -CTA cor : calcium score 11 (48%), cor's normal except RI mild calcification, mac, av calcification, moderate hiatal hernia, lung nodule RML - consider CT 6-12 months\par \par -ELIZA w doppler : no YANNI\par \par \par no cp, sob\par no syncope, lh, palpitations, edema, orthopnea, pnd\par \par notes snoring\par not exercising\par Had moderate hiatal hernia on CT abd/pelvis\par seen by rheum for elevated esr/funmilayo\par \par PMH/PSH:\par cad\par predm\par htn\par glaucoma\par funmilayo positive\par hematuria\par low vit D\par osteopenia\par thrombophilia\par gerd\par gout\par cataract\par overweight\par moderate hiatal hernia\par \par MEDS:\par calcium\par losartan 50 mg qd\par spironolactone 25 mg qd\par hctz 25 mg qd\par coreg 25 qd\par vit d3\par prilosec\par  \par \par \par ALL:\par epi\par \par \par SH:\par no tobacco, quit , former tobacco 20 years, 5-10 cigs/day\par no etoh/drugs\par from [place]\par lived [place] since \par retired - used to work for [employer] \par \par no children\par \par \par FH:\par mother - heart disease 50's, htn,  91\par father - h/o prostate disease,  73\par siblings - htn\par

## 2023-05-08 ENCOUNTER — APPOINTMENT (OUTPATIENT)
Dept: OPHTHALMOLOGY | Facility: CLINIC | Age: 73
End: 2023-05-08
Payer: MEDICARE

## 2023-05-08 ENCOUNTER — NON-APPOINTMENT (OUTPATIENT)
Age: 73
End: 2023-05-08

## 2023-05-08 LAB
ALBUMIN SERPL ELPH-MCNC: 4.2 G/DL
ALP BLD-CCNC: 77 U/L
ALT SERPL-CCNC: 12 U/L
ANION GAP SERPL CALC-SCNC: 13 MMOL/L
APPEARANCE: CLEAR
AST SERPL-CCNC: 20 U/L
BACTERIA: NEGATIVE /HPF
BASOPHILS # BLD AUTO: 0.05 K/UL
BASOPHILS NFR BLD AUTO: 0.8 %
BILIRUB SERPL-MCNC: 0.2 MG/DL
BILIRUBIN URINE: NEGATIVE
BLOOD URINE: ABNORMAL
BUN SERPL-MCNC: 24 MG/DL
CALCIUM SERPL-MCNC: 9.4 MG/DL
CAST: 0 /LPF
CHLORIDE SERPL-SCNC: 96 MMOL/L
CHOLEST SERPL-MCNC: 213 MG/DL
CO2 SERPL-SCNC: 26 MMOL/L
COLOR: YELLOW
CREAT SERPL-MCNC: 0.65 MG/DL
CREAT SPEC-SCNC: 21 MG/DL
EGFR: 94 ML/MIN/1.73M2
EOSINOPHIL # BLD AUTO: 0.11 K/UL
EOSINOPHIL NFR BLD AUTO: 1.8 %
EPITHELIAL CELLS: 1 /HPF
ESTIMATED AVERAGE GLUCOSE: 140 MG/DL
GLUCOSE QUALITATIVE U: NEGATIVE MG/DL
GLUCOSE SERPL-MCNC: 110 MG/DL
HBA1C MFR BLD HPLC: 6.5 %
HCT VFR BLD CALC: 35.1 %
HDLC SERPL-MCNC: 73 MG/DL
HGB BLD-MCNC: 11.2 G/DL
IMM GRANULOCYTES NFR BLD AUTO: 0.2 %
KETONES URINE: NEGATIVE MG/DL
LDLC SERPL CALC-MCNC: 125 MG/DL
LEUKOCYTE ESTERASE URINE: NEGATIVE
LYMPHOCYTES # BLD AUTO: 1.75 K/UL
LYMPHOCYTES NFR BLD AUTO: 28.5 %
MAGNESIUM SERPL-MCNC: 2.7 MG/DL
MAN DIFF?: NORMAL
MCHC RBC-ENTMCNC: 28.9 PG
MCHC RBC-ENTMCNC: 31.9 GM/DL
MCV RBC AUTO: 90.7 FL
MICROALBUMIN 24H UR DL<=1MG/L-MCNC: <1.2 MG/DL
MICROALBUMIN/CREAT 24H UR-RTO: NORMAL MG/G
MICROSCOPIC-UA: NORMAL
MONOCYTES # BLD AUTO: 0.52 K/UL
MONOCYTES NFR BLD AUTO: 8.5 %
NEUTROPHILS # BLD AUTO: 3.71 K/UL
NEUTROPHILS NFR BLD AUTO: 60.2 %
NITRITE URINE: NEGATIVE
NONHDLC SERPL-MCNC: 140 MG/DL
PH URINE: 8
PLATELET # BLD AUTO: 180 K/UL
POTASSIUM SERPL-SCNC: 4.4 MMOL/L
PROT SERPL-MCNC: 7.5 G/DL
PROTEIN URINE: NEGATIVE MG/DL
RBC # BLD: 3.87 M/UL
RBC # FLD: 13.7 %
RED BLOOD CELLS URINE: 0 /HPF
SODIUM SERPL-SCNC: 135 MMOL/L
SPECIFIC GRAVITY URINE: 1.01
TRIGL SERPL-MCNC: 77 MG/DL
TSH SERPL-ACNC: 2.2 UIU/ML
UROBILINOGEN URINE: 0.2 MG/DL
WBC # FLD AUTO: 6.15 K/UL
WHITE BLOOD CELLS URINE: 0 /HPF

## 2023-05-08 PROCEDURE — 92136 OPHTHALMIC BIOMETRY: CPT

## 2023-05-09 ENCOUNTER — RX RENEWAL (OUTPATIENT)
Age: 73
End: 2023-05-09

## 2023-05-09 NOTE — ASU PATIENT PROFILE, ADULT - SITE
Cataract extraction with intraocular lens implant, possible anterior vitrectomy of the left eye laterality

## 2023-05-09 NOTE — ASU PATIENT PROFILE, ADULT - WILL THE PATIENT ACCEPT THE PFIZER COVID-19 VACCINE IF ELIGIBLE AND IT IS AVAILABLE?
Anesthesia Post Evaluation    Patient: Senthil Chandler    Procedure(s) Performed: Procedure(s) (LRB):  ROBOTIC COLECTOMY possible conversion to open (Right)  COLECTOMY, PARTIAL (Right)  CHOLECYSTECTOMY, LAPAROSCOPIC (Right)    Final Anesthesia Type: general  Patient location during evaluation: PACU  Patient participation: Yes- Able to Participate  Level of consciousness: awake and alert and oriented  Post-procedure vital signs: reviewed and stable  Pain management: adequate  Airway patency: patent  PONV status at discharge: No PONV  Anesthetic complications: no      Cardiovascular status: blood pressure returned to baseline  Respiratory status: unassisted, spontaneous ventilation and room air  Hydration status: euvolemic  Follow-up not needed.          Vitals Value Taken Time   /86 3/25/2019 12:55 PM   Temp 36.6 °C (97.9 °F) 3/25/2019 12:50 PM   Pulse 76 3/25/2019 12:58 PM   Resp 18 3/25/2019 12:58 PM   SpO2 100 % 3/25/2019 12:58 PM   Vitals shown include unvalidated device data.      No case tracking events are documented in the log.      Pain/Gilda Score: Pain Rating Prior to Med Admin: 7 (3/25/2019  2:05 PM)  Gilda Score: 8 (3/25/2019  2:30 PM)         No

## 2023-05-09 NOTE — ASU PATIENT PROFILE, ADULT - NS PREOP UNDERSTANDS INFO
spoke to patient to be NPO/NO solid foods, , dairy , sweet after 2200 pm tonight. allow to drink water apple juice till 12Mn. dress comfortable, . leave all valuable at home, Bring ID photo and insurance cards, address and telephone given to patient./yes

## 2023-05-09 NOTE — ASU PATIENT PROFILE, ADULT - NSICDXPASTSURGICALHX_GEN_ALL_CORE_FT
PAST SURGICAL HISTORY:  No significant past surgical history PAST SURGICAL HISTORY:  H/O colonoscopy     H/O endoscopy

## 2023-05-10 ENCOUNTER — APPOINTMENT (OUTPATIENT)
Dept: OPHTHALMOLOGY | Facility: AMBULATORY SURGERY CENTER | Age: 73
End: 2023-05-10

## 2023-05-10 ENCOUNTER — TRANSCRIPTION ENCOUNTER (OUTPATIENT)
Age: 73
End: 2023-05-10

## 2023-05-10 ENCOUNTER — NON-APPOINTMENT (OUTPATIENT)
Age: 73
End: 2023-05-10

## 2023-05-10 ENCOUNTER — OUTPATIENT (OUTPATIENT)
Dept: OUTPATIENT SERVICES | Facility: HOSPITAL | Age: 73
LOS: 1 days | Discharge: HOME | End: 2023-05-10
Payer: MEDICARE

## 2023-05-10 VITALS
SYSTOLIC BLOOD PRESSURE: 125 MMHG | OXYGEN SATURATION: 99 % | HEART RATE: 57 BPM | TEMPERATURE: 99 F | DIASTOLIC BLOOD PRESSURE: 59 MMHG | RESPIRATION RATE: 16 BRPM

## 2023-05-10 VITALS
HEIGHT: 61 IN | SYSTOLIC BLOOD PRESSURE: 169 MMHG | OXYGEN SATURATION: 98 % | HEART RATE: 60 BPM | DIASTOLIC BLOOD PRESSURE: 54 MMHG | WEIGHT: 146.61 LBS | TEMPERATURE: 98 F | RESPIRATION RATE: 16 BRPM

## 2023-05-10 DIAGNOSIS — Z98.890 OTHER SPECIFIED POSTPROCEDURAL STATES: Chronic | ICD-10-CM

## 2023-05-10 PROCEDURE — 66982 XCAPSL CTRC RMVL CPLX WO ECP: CPT | Mod: LT

## 2023-05-10 DEVICE — LENS IOL TECNIS PROTEC ZCB00 20.0D
Type: IMPLANTABLE DEVICE | Site: LEFT (LEFT EYE) | Status: NON-FUNCTIONAL
Removed: 2023-05-10

## 2023-05-10 RX ORDER — SODIUM CHLORIDE 9 MG/ML
500 INJECTION, SOLUTION INTRAVENOUS
Refills: 0 | Status: DISCONTINUED | OUTPATIENT
Start: 2023-05-10 | End: 2023-05-10

## 2023-05-10 RX ORDER — OFLOXACIN 0.3 %
1 DROPS OPHTHALMIC (EYE)
Refills: 0 | Status: COMPLETED | OUTPATIENT
Start: 2023-05-10 | End: 2023-05-10

## 2023-05-10 RX ORDER — TROPICAMIDE 1 %
1 DROPS OPHTHALMIC (EYE)
Refills: 0 | Status: COMPLETED | OUTPATIENT
Start: 2023-05-10 | End: 2023-05-10

## 2023-05-10 RX ORDER — ONDANSETRON 8 MG/1
4 TABLET, FILM COATED ORAL ONCE
Refills: 0 | Status: DISCONTINUED | OUTPATIENT
Start: 2023-05-10 | End: 2023-05-10

## 2023-05-10 RX ORDER — PHENYLEPHRINE HCL 2.5 %
1 DROPS OPHTHALMIC (EYE)
Refills: 0 | Status: COMPLETED | OUTPATIENT
Start: 2023-05-10 | End: 2023-05-10

## 2023-05-10 RX ORDER — CYCLOPENTOLATE HYDROCHLORIDE 10 MG/ML
1 SOLUTION/ DROPS OPHTHALMIC
Refills: 0 | Status: COMPLETED | OUTPATIENT
Start: 2023-05-10 | End: 2023-05-10

## 2023-05-10 RX ORDER — ACETAMINOPHEN 500 MG
1000 TABLET ORAL ONCE
Refills: 0 | Status: DISCONTINUED | OUTPATIENT
Start: 2023-05-10 | End: 2023-05-10

## 2023-05-10 RX ADMIN — Medication 1 DROP(S): at 08:10

## 2023-05-10 RX ADMIN — CYCLOPENTOLATE HYDROCHLORIDE 1 DROP(S): 10 SOLUTION/ DROPS OPHTHALMIC at 08:20

## 2023-05-10 RX ADMIN — Medication 1 DROP(S): at 08:15

## 2023-05-10 RX ADMIN — Medication 1 DROP(S): at 08:20

## 2023-05-10 RX ADMIN — CYCLOPENTOLATE HYDROCHLORIDE 1 DROP(S): 10 SOLUTION/ DROPS OPHTHALMIC at 08:10

## 2023-05-10 RX ADMIN — CYCLOPENTOLATE HYDROCHLORIDE 1 DROP(S): 10 SOLUTION/ DROPS OPHTHALMIC at 08:15

## 2023-05-10 NOTE — PRE-ANESTHESIA EVALUATION ADULT - NSANTHOSAYNRD_GEN_A_CORE
No. MECCA screening performed.  STOP BANG Legend: 0-2 = LOW Risk; 3-4 = INTERMEDIATE Risk; 5-8 = HIGH Risk

## 2023-05-10 NOTE — OPERATIVE REPORT - OPERATIVE RPOSRT DETAILS
Patient Name: LUIS DANIEL PENA    Medical Record Number: [id number]    DATE OF SURGERY: MAY 10, 2023    OPERATING SURGEON: ALPHONSO RAIN M.D.    ASSISTANT SURGEON: INDERJIT EDGE M.D.    ANESTHESIA: MONITORED ANESTHESIA CARE & TOPICAL.    PREOPERATIVE DIAGNOSIS: CATARACT, PUPILLARY ABNORMALITY, LEFT EYE.     POSTOPERATIVE DIAGNOSIS: CATARACT, PUPILLARY ABNORMALITY, LEFT EYE.     OPERATIVE PROCEDURE: CATARACT EXTRACTION, INTRAOCULAR LENS IMPLANTATION, LEFT EYE     COMPLICATIONS: NONE.    SPECIMEN: NONE.    ESTIMATED BLOOD LOSS: <1 cc    PATIENT CONDITION: STABLE.    PROCEDURE:  Prior to the procedure, all risks, benefits and alternatives were discussed with the patient, including but not limited to infection, bleeding, retinal detachment, increase or decrease in intraocular pressure, corneal edema, corneal decompensation, ptosis, diplopia, loss of vision, no improvement of vision, need for second surgery, macular edema, intraocular inflammation, etc. All questions were answered and the patient wished to proceed with the surgery. Informed consent was obtained.    The patient was wheeled to the operating room and placed on the operating table in a supine position. Next, the left eye was prepped and draped in the usual sterile fashion for intraocular surgery. An eyelid speculum was placed into the left eye.    Small pupil was noted. Inferotemporal and superotemporal paracenteses were created using a 20 gauge MVR blade, followed by injection of Trypan blue. Trypan blue was washed out using balanced salt solution. Viscoat was injected into the anterior chamber. A temporal biplanar clear corneal incision was then created using a 2.4-mm keratome. Next, a continuous curvilinear capsulorrhexis was created with a 27 gauge needle cystotome and Utrata forceps. Balanced salt solution hydrodissection and hydrodelineation were performed. Iris hooks were used to dilate the pupil. The lens was phacoemulsified using a divide-and-conquer technique. Residual epinucleus and cortical material were removed using a double-handpiece irrigation & aspiration device.    Healon was injected into the bag. A 20.0-diopter ANIRUDH Tecnis ZCB00 lens, serial number [id number], was inserted into the capsular bag and rotated into place. Iris hooks were removed. The viscoelastic was then removed using irrigation and aspiration. The wounds were hydrated. At the end of the procedure, the anterior chamber was well formed. The intraocular pressure was in the mid-teens by palpation. All wounds were water-tight with no leakage. Cefazolin and dexamethasone were applied on the cornea and conjunctiva. The eyelid speculum was removed. Topical antibiotic and steroid ointment was placed onto the left eye, which was then patched and shielded. The patient was wheeled to the recovery room in a stable and excellent condition.

## 2023-05-10 NOTE — ASU PREOP CHECKLIST - 1.
OR contacted x3 regarading pre op eye drops order, as per Dany orders will be placed in 40 min. 8:05am

## 2023-05-11 ENCOUNTER — APPOINTMENT (OUTPATIENT)
Dept: OPHTHALMOLOGY | Facility: CLINIC | Age: 73
End: 2023-05-11
Payer: MEDICARE

## 2023-05-11 ENCOUNTER — NON-APPOINTMENT (OUTPATIENT)
Age: 73
End: 2023-05-11

## 2023-05-11 PROCEDURE — 99024 POSTOP FOLLOW-UP VISIT: CPT

## 2023-05-22 ENCOUNTER — NON-APPOINTMENT (OUTPATIENT)
Age: 73
End: 2023-05-22

## 2023-05-22 ENCOUNTER — APPOINTMENT (OUTPATIENT)
Dept: OPHTHALMOLOGY | Facility: CLINIC | Age: 73
End: 2023-05-22
Payer: MEDICARE

## 2023-05-22 PROCEDURE — 99024 POSTOP FOLLOW-UP VISIT: CPT

## 2023-06-02 ENCOUNTER — NON-APPOINTMENT (OUTPATIENT)
Age: 73
End: 2023-06-02

## 2023-06-02 ENCOUNTER — APPOINTMENT (OUTPATIENT)
Dept: OPHTHALMOLOGY | Facility: CLINIC | Age: 73
End: 2023-06-02
Payer: MEDICARE

## 2023-06-02 PROCEDURE — 99024 POSTOP FOLLOW-UP VISIT: CPT

## 2023-06-05 NOTE — ED PROVIDER NOTE - OBJECTIVE STATEMENT
Mr. West is an extremely pleasant 56-year-old  male referred by Dr. Juanpablo Pendleton for iron deficiency anemia and b12 deficiency. She has had increased fatigue and dyspnea since January, much worse since March. His stools were intermittently dark before starting iron pills 3 weeks back and then they have all been black since then. Very intermittent and mild hematochezia. No significant abdominal pain.  no weight loss.  He has had longstanding GERD which is well controlled with Zegerid three times a week. No change in his bowel habits, bowel movement each morning.   No family history of GI malignancy.  He is not a vegetarian.  No plica, but he does crave cold water. Last colonoscopy in 2019 with no adenomas.  71 y/ F w/ Hx of HTN (on losartan), presents for high BP in 200s/80s while at urologist office today for chronic hematuria. States that she took Losartan 50mg 2x today for high BP. Also reports a Hx of heart murmur that is f/u by PMD. Pt endorses bilat knee pain and generalized fatigue. Pt denies headache, CP, SOB. Reports increased stress lately.

## 2023-06-06 ENCOUNTER — RX RENEWAL (OUTPATIENT)
Age: 73
End: 2023-06-06

## 2023-06-20 ENCOUNTER — NON-APPOINTMENT (OUTPATIENT)
Age: 73
End: 2023-06-20

## 2023-06-20 ENCOUNTER — APPOINTMENT (OUTPATIENT)
Dept: OPHTHALMOLOGY | Facility: CLINIC | Age: 73
End: 2023-06-20
Payer: MEDICARE

## 2023-06-20 PROCEDURE — 99024 POSTOP FOLLOW-UP VISIT: CPT

## 2023-07-05 ENCOUNTER — RX RENEWAL (OUTPATIENT)
Age: 73
End: 2023-07-05

## 2023-07-14 ENCOUNTER — APPOINTMENT (OUTPATIENT)
Dept: HEART AND VASCULAR | Facility: CLINIC | Age: 73
End: 2023-07-14
Payer: MEDICARE

## 2023-07-14 VITALS — SYSTOLIC BLOOD PRESSURE: 171 MMHG | DIASTOLIC BLOOD PRESSURE: 67 MMHG

## 2023-07-14 DIAGNOSIS — I35.0 NONRHEUMATIC AORTIC (VALVE) STENOSIS: ICD-10-CM

## 2023-07-14 PROCEDURE — 93306 TTE W/DOPPLER COMPLETE: CPT

## 2023-08-01 ENCOUNTER — RX RENEWAL (OUTPATIENT)
Age: 73
End: 2023-08-01

## 2023-08-17 ENCOUNTER — APPOINTMENT (OUTPATIENT)
Dept: INTERNAL MEDICINE | Facility: CLINIC | Age: 73
End: 2023-08-17
Payer: MEDICARE

## 2023-08-17 VITALS
SYSTOLIC BLOOD PRESSURE: 157 MMHG | DIASTOLIC BLOOD PRESSURE: 72 MMHG | OXYGEN SATURATION: 99 % | HEIGHT: 61 IN | WEIGHT: 146 LBS | BODY MASS INDEX: 27.56 KG/M2 | HEART RATE: 61 BPM | RESPIRATION RATE: 14 BRPM

## 2023-08-17 VITALS — SYSTOLIC BLOOD PRESSURE: 137 MMHG | DIASTOLIC BLOOD PRESSURE: 67 MMHG

## 2023-08-17 DIAGNOSIS — M25.561 PAIN IN RIGHT KNEE: ICD-10-CM

## 2023-08-17 DIAGNOSIS — G89.29 PAIN IN RIGHT KNEE: ICD-10-CM

## 2023-08-17 DIAGNOSIS — R21 RASH AND OTHER NONSPECIFIC SKIN ERUPTION: ICD-10-CM

## 2023-08-17 DIAGNOSIS — K21.9 GASTRO-ESOPHAGEAL REFLUX DISEASE W/OUT ESOPHAGITIS: ICD-10-CM

## 2023-08-17 DIAGNOSIS — M25.562 PAIN IN LEFT KNEE: ICD-10-CM

## 2023-08-17 DIAGNOSIS — E21.3 HYPERPARATHYROIDISM, UNSPECIFIED: ICD-10-CM

## 2023-08-17 DIAGNOSIS — R73.03 PREDIABETES.: ICD-10-CM

## 2023-08-17 DIAGNOSIS — E55.9 VITAMIN D DEFICIENCY, UNSPECIFIED: ICD-10-CM

## 2023-08-17 DIAGNOSIS — R79.89 OTHER SPECIFIED ABNORMAL FINDINGS OF BLOOD CHEMISTRY: ICD-10-CM

## 2023-08-17 DIAGNOSIS — R91.8 OTHER NONSPECIFIC ABNORMAL FINDING OF LUNG FIELD: ICD-10-CM

## 2023-08-17 DIAGNOSIS — H26.9 UNSPECIFIED CATARACT: ICD-10-CM

## 2023-08-17 DIAGNOSIS — L70.9 ACNE, UNSPECIFIED: ICD-10-CM

## 2023-08-17 DIAGNOSIS — I25.10 ATHEROSCLEROTIC HEART DISEASE OF NATIVE CORONARY ARTERY W/OUT ANGINA PECTORIS: ICD-10-CM

## 2023-08-17 DIAGNOSIS — M85.80 OTHER SPECIFIED DISORDERS OF BONE DENSITY AND STRUCTURE, UNSPECIFIED SITE: ICD-10-CM

## 2023-08-17 DIAGNOSIS — Z01.419 ENCOUNTER FOR GYNECOLOGICAL EXAMINATION (GENERAL) (ROUTINE) W/OUT ABNORMAL FINDINGS: ICD-10-CM

## 2023-08-17 DIAGNOSIS — R01.1 CARDIAC MURMUR, UNSPECIFIED: ICD-10-CM

## 2023-08-17 PROCEDURE — 36415 COLL VENOUS BLD VENIPUNCTURE: CPT

## 2023-08-17 PROCEDURE — G0439: CPT

## 2023-08-17 PROCEDURE — 99213 OFFICE O/P EST LOW 20 MIN: CPT | Mod: 25

## 2023-08-17 RX ORDER — BENZOYL PEROXIDE 5 G/100G
5 GEL TOPICAL DAILY
Qty: 1 | Refills: 0 | Status: ACTIVE | COMMUNITY
Start: 2023-08-17 | End: 1900-01-01

## 2023-08-17 RX ORDER — FAMOTIDINE 20 MG/1
20 TABLET, FILM COATED ORAL
Qty: 180 | Refills: 0 | Status: ACTIVE | COMMUNITY
Start: 2022-06-06 | End: 1900-01-01

## 2023-08-17 NOTE — HISTORY OF PRESENT ILLNESS
[FreeTextEntry1] : annual physical [de-identified] : Pt is a 71 y/o F with PMHx of HTN, cataracts, glaucoma, overweight, hematuria, prediabetes, CAD who presents to the office today for an annual physical.  HTN: - at home states BP is good, most recently was 130 systolic but ranges up to 150 systolic   R knee pain: - chronic was recommended TKR but does not want to have it done - is interested in second opinion - does not want to resume PT or try medication.  Does home exercises  HCM - Covid vaccine: original no boosters - PNA vaccine: will think about - Influenza vaccine:  will think about - Colonoscopy: 2018 WNL - Mammo: needs - last one June 2023 - Pap: WNL - DEXA: due - CT chest: had CT angio which noted pulmonary nodule 6.5 mm RML. ? ground glass opacity - Ophthalmology: UTD - Dentist: UTD

## 2023-08-17 NOTE — HEALTH RISK ASSESSMENT
[HIV test declined] : HIV test declined [Hepatitis C test declined] : Hepatitis C test declined [Former] : Former [Patient reported mammogram was normal] : Patient reported mammogram was normal [Patient reported colonoscopy was normal] : Patient reported colonoscopy was normal [No falls in past year] : Patient reported no falls in the past year [PHQ-2 Negative - No further assessment needed] : PHQ-2 Negative - No further assessment needed [Change in mental status noted] : No change in mental status noted [MammogramDate] : 6/22

## 2023-08-21 LAB
25(OH)D3 SERPL-MCNC: 23.1 NG/ML
ALBUMIN SERPL ELPH-MCNC: 4 G/DL
ALP BLD-CCNC: 58 U/L
ALT SERPL-CCNC: 12 U/L
ANION GAP SERPL CALC-SCNC: 12 MMOL/L
APPEARANCE: CLEAR
AST SERPL-CCNC: 23 U/L
BACTERIA: ABNORMAL /HPF
BILIRUB SERPL-MCNC: <0.2 MG/DL
BILIRUBIN URINE: NEGATIVE
BLOOD URINE: ABNORMAL
BUN SERPL-MCNC: 22 MG/DL
CALCIUM SERPL-MCNC: 9.7 MG/DL
CAST: 0 /LPF
CHLORIDE SERPL-SCNC: 99 MMOL/L
CHOLEST SERPL-MCNC: 146 MG/DL
CO2 SERPL-SCNC: 26 MMOL/L
COLOR: YELLOW
CREAT SERPL-MCNC: 0.55 MG/DL
EGFR: 98 ML/MIN/1.73M2
EPITHELIAL CELLS: 17 /HPF
ESTIMATED AVERAGE GLUCOSE: 128 MG/DL
FERRITIN SERPL-MCNC: 76 NG/ML
FOLATE SERPL-MCNC: 10.2 NG/ML
GLUCOSE QUALITATIVE U: NEGATIVE MG/DL
GLUCOSE SERPL-MCNC: 120 MG/DL
HBA1C MFR BLD HPLC: 6.1 %
HDLC SERPL-MCNC: 75 MG/DL
IRON SATN MFR SERPL: 22 %
IRON SERPL-MCNC: 54 UG/DL
KETONES URINE: ABNORMAL MG/DL
LDLC SERPL CALC-MCNC: 58 MG/DL
LEUKOCYTE ESTERASE URINE: ABNORMAL
MICROSCOPIC-UA: NORMAL
NITRITE URINE: NEGATIVE
NONHDLC SERPL-MCNC: 71 MG/DL
PH URINE: 6
POTASSIUM SERPL-SCNC: 4 MMOL/L
PROT SERPL-MCNC: 6.9 G/DL
PROTEIN URINE: NEGATIVE MG/DL
RED BLOOD CELLS URINE: 18 /HPF
SODIUM SERPL-SCNC: 136 MMOL/L
SPECIFIC GRAVITY URINE: 1.02
TIBC SERPL-MCNC: 247 UG/DL
TRIGL SERPL-MCNC: 59 MG/DL
TSH SERPL-ACNC: 2.22 UIU/ML
UIBC SERPL-MCNC: 192 UG/DL
UROBILINOGEN URINE: 0.2 MG/DL
VIT B12 SERPL-MCNC: 555 PG/ML
WHITE BLOOD CELLS URINE: 1 /HPF

## 2023-08-21 RX ORDER — CHOLECALCIFEROL (VITAMIN D3) 1250 MCG
1.25 MG CAPSULE ORAL
Qty: 13 | Refills: 3 | Status: ACTIVE | COMMUNITY
Start: 2021-05-05 | End: 1900-01-01

## 2023-08-22 ENCOUNTER — APPOINTMENT (OUTPATIENT)
Dept: INTERNAL MEDICINE | Facility: CLINIC | Age: 73
End: 2023-08-22
Payer: MEDICARE

## 2023-08-22 VITALS
SYSTOLIC BLOOD PRESSURE: 187 MMHG | HEART RATE: 61 BPM | WEIGHT: 146 LBS | OXYGEN SATURATION: 100 % | BODY MASS INDEX: 27.56 KG/M2 | HEIGHT: 61 IN | TEMPERATURE: 97.8 F | DIASTOLIC BLOOD PRESSURE: 74 MMHG

## 2023-08-22 DIAGNOSIS — Z01.818 ENCOUNTER FOR OTHER PREPROCEDURAL EXAMINATION: ICD-10-CM

## 2023-08-22 DIAGNOSIS — Z01.812 ENCOUNTER FOR PREPROCEDURAL LABORATORY EXAMINATION: ICD-10-CM

## 2023-08-22 PROCEDURE — 99214 OFFICE O/P EST MOD 30 MIN: CPT

## 2023-08-22 NOTE — HISTORY OF PRESENT ILLNESS
[Aortic Stenosis] : aortic stenosis [Atrial Fibrillation] : no atrial fibrillation [Coronary Artery Disease] : coronary artery disease [Recent Myocardial Infarction] : no recent myocardial infarction [Implantable Device/Pacemaker] : no implantable device/pacemaker [Asthma] : no asthma [COPD] : no COPD [Sleep Apnea] : no sleep apnea [Smoker] : not a smoker [No Adverse Anesthesia Reaction] : no adverse anesthesia reaction in self or family member [Chronic Anticoagulation] : no chronic anticoagulation [Chronic Kidney Disease] : no chronic kidney disease [Diabetes] : no diabetes [(Patient denies any chest pain, claudication, dyspnea on exertion, orthopnea, palpitations or syncope)] : Patient denies any chest pain, claudication, dyspnea on exertion, orthopnea, palpitations or syncope [Moderate (4-6 METs)] : Moderate (4-6 METs) [FreeTextEntry1] : RIGHT cataract surgery [FreeTextEntry2] : 8/30/23 [FreeTextEntry3] : Dr. Pearl [FreeTextEntry4] : h/o b/l cataracts s/p left cataract repair now scheduled for right eye surgery.  [FreeTextEntry7] : CT angio 2/22 Lcx 11  EKG 4/17/23: sinus burke.

## 2023-08-29 NOTE — ASU PATIENT PROFILE, ADULT - NS PREOP UNDERSTANDS INFO
no solid foods after 10 pm tonight, allowed to drink water   till 4am, dress comfortable,  leave all valuable at home, bring ID photo and insurance cards, address and telephone given to patient./yes

## 2023-08-29 NOTE — ASU PATIENT PROFILE, ADULT - FALL HARM RISK - HARM RISK INTERVENTIONS

## 2023-08-29 NOTE — ASU PATIENT PROFILE, ADULT - NSICDXPASTMEDICALHX_GEN_ALL_CORE_FT
PAST MEDICAL HISTORY:  GERD (gastroesophageal reflux disease)     Hematuria, microscopic     Hypertension

## 2023-08-29 NOTE — ASU PATIENT PROFILE, ADULT - TEACHING/LEARNING LEARNING PREFERENCES
Detail Level: Detailed Detail Level: Generalized Detail Level: Simple individual instruction/verbal instruction/written material

## 2023-08-30 ENCOUNTER — TRANSCRIPTION ENCOUNTER (OUTPATIENT)
Age: 73
End: 2023-08-30

## 2023-08-30 ENCOUNTER — NON-APPOINTMENT (OUTPATIENT)
Age: 73
End: 2023-08-30

## 2023-08-30 ENCOUNTER — APPOINTMENT (OUTPATIENT)
Dept: OPHTHALMOLOGY | Facility: AMBULATORY SURGERY CENTER | Age: 73
End: 2023-08-30

## 2023-08-30 ENCOUNTER — OUTPATIENT (OUTPATIENT)
Dept: OUTPATIENT SERVICES | Facility: HOSPITAL | Age: 73
LOS: 1 days | Discharge: HOME | End: 2023-08-30
Payer: MEDICARE

## 2023-08-30 VITALS
TEMPERATURE: 98 F | OXYGEN SATURATION: 99 % | HEIGHT: 61 IN | HEART RATE: 58 BPM | DIASTOLIC BLOOD PRESSURE: 49 MMHG | SYSTOLIC BLOOD PRESSURE: 161 MMHG | RESPIRATION RATE: 16 BRPM | WEIGHT: 143.74 LBS

## 2023-08-30 VITALS
DIASTOLIC BLOOD PRESSURE: 40 MMHG | SYSTOLIC BLOOD PRESSURE: 143 MMHG | HEART RATE: 61 BPM | TEMPERATURE: 98 F | RESPIRATION RATE: 16 BRPM | OXYGEN SATURATION: 99 %

## 2023-08-30 DIAGNOSIS — Z98.49 CATARACT EXTRACTION STATUS, UNSPECIFIED EYE: Chronic | ICD-10-CM

## 2023-08-30 DIAGNOSIS — Z98.890 OTHER SPECIFIED POSTPROCEDURAL STATES: Chronic | ICD-10-CM

## 2023-08-30 PROCEDURE — 66982 XCAPSL CTRC RMVL CPLX WO ECP: CPT | Mod: RT

## 2023-08-30 DEVICE — LENS IOL TECNIS PROTEC ZCB00 25.5D
Type: IMPLANTABLE DEVICE | Site: RIGHT | Status: NON-FUNCTIONAL
Removed: 2023-08-30

## 2023-08-30 RX ORDER — LOSARTAN POTASSIUM 100 MG/1
1 TABLET, FILM COATED ORAL
Refills: 0 | DISCHARGE

## 2023-08-30 RX ORDER — CYCLOPENTOLATE HYDROCHLORIDE 10 MG/ML
1 SOLUTION/ DROPS OPHTHALMIC
Refills: 0 | Status: COMPLETED | OUTPATIENT
Start: 2023-08-30 | End: 2023-08-30

## 2023-08-30 RX ORDER — FAMOTIDINE 10 MG/ML
1 INJECTION INTRAVENOUS
Refills: 0 | DISCHARGE

## 2023-08-30 RX ORDER — TROPICAMIDE 1 %
1 DROPS OPHTHALMIC (EYE)
Refills: 0 | Status: COMPLETED | OUTPATIENT
Start: 2023-08-30 | End: 2023-08-30

## 2023-08-30 RX ORDER — CARVEDILOL PHOSPHATE 80 MG/1
0 CAPSULE, EXTENDED RELEASE ORAL
Refills: 0 | DISCHARGE

## 2023-08-30 RX ORDER — HYDROCHLOROTHIAZIDE 25 MG
1 TABLET ORAL
Refills: 0 | DISCHARGE

## 2023-08-30 RX ORDER — SPIRONOLACTONE 25 MG/1
1 TABLET, FILM COATED ORAL
Refills: 0 | DISCHARGE

## 2023-08-30 RX ORDER — SODIUM CHLORIDE 9 MG/ML
500 INJECTION, SOLUTION INTRAVENOUS
Refills: 0 | Status: DISCONTINUED | OUTPATIENT
Start: 2023-08-30 | End: 2023-08-30

## 2023-08-30 RX ORDER — ACETAMINOPHEN 500 MG
650 TABLET ORAL ONCE
Refills: 0 | Status: DISCONTINUED | OUTPATIENT
Start: 2023-08-30 | End: 2023-08-30

## 2023-08-30 RX ORDER — CARVEDILOL PHOSPHATE 80 MG/1
1 CAPSULE, EXTENDED RELEASE ORAL
Refills: 0 | DISCHARGE

## 2023-08-30 RX ORDER — OFLOXACIN 0.3 %
1 DROPS OPHTHALMIC (EYE)
Refills: 0 | Status: COMPLETED | OUTPATIENT
Start: 2023-08-30 | End: 2023-08-30

## 2023-08-30 RX ORDER — PHENYLEPHRINE HCL 2.5 %
1 DROPS OPHTHALMIC (EYE)
Refills: 0 | Status: COMPLETED | OUTPATIENT
Start: 2023-08-30 | End: 2023-08-30

## 2023-08-30 RX ADMIN — Medication 1 DROP(S): at 07:20

## 2023-08-30 RX ADMIN — CYCLOPENTOLATE HYDROCHLORIDE 1 DROP(S): 10 SOLUTION/ DROPS OPHTHALMIC at 07:03

## 2023-08-30 RX ADMIN — Medication 1 DROP(S): at 07:03

## 2023-08-30 RX ADMIN — CYCLOPENTOLATE HYDROCHLORIDE 1 DROP(S): 10 SOLUTION/ DROPS OPHTHALMIC at 07:09

## 2023-08-30 RX ADMIN — CYCLOPENTOLATE HYDROCHLORIDE 1 DROP(S): 10 SOLUTION/ DROPS OPHTHALMIC at 07:19

## 2023-08-30 RX ADMIN — Medication 1 DROP(S): at 07:09

## 2023-08-30 RX ADMIN — Medication 1 DROP(S): at 07:04

## 2023-08-30 RX ADMIN — Medication 1 DROP(S): at 07:10

## 2023-08-31 ENCOUNTER — TRANSCRIPTION ENCOUNTER (OUTPATIENT)
Age: 73
End: 2023-08-31

## 2023-08-31 ENCOUNTER — APPOINTMENT (OUTPATIENT)
Dept: OPHTHALMOLOGY | Facility: CLINIC | Age: 73
End: 2023-08-31
Payer: MEDICARE

## 2023-08-31 ENCOUNTER — NON-APPOINTMENT (OUTPATIENT)
Age: 73
End: 2023-08-31

## 2023-08-31 PROBLEM — R31.29 OTHER MICROSCOPIC HEMATURIA: Chronic | Status: ACTIVE | Noted: 2023-08-29

## 2023-08-31 PROBLEM — K21.9 GASTRO-ESOPHAGEAL REFLUX DISEASE WITHOUT ESOPHAGITIS: Chronic | Status: ACTIVE | Noted: 2023-08-29

## 2023-08-31 LAB
APPEARANCE: ABNORMAL
BACTERIA: ABNORMAL /HPF
BILIRUBIN URINE: NEGATIVE
BLOOD URINE: ABNORMAL
CAST: 0 /LPF
COLOR: NORMAL
EPITHELIAL CELLS: 23 /HPF
GLUCOSE QUALITATIVE U: NEGATIVE MG/DL
KETONES URINE: ABNORMAL MG/DL
LEUKOCYTE ESTERASE URINE: ABNORMAL
MICROSCOPIC-UA: NORMAL
NITRITE URINE: NEGATIVE
PH URINE: 7
PROTEIN URINE: NORMAL MG/DL
RED BLOOD CELLS URINE: 26 /HPF
REVIEW: NORMAL
SPECIFIC GRAVITY URINE: 1.02
UROBILINOGEN URINE: 1 MG/DL
WHITE BLOOD CELLS URINE: 2 /HPF

## 2023-08-31 PROCEDURE — 99024 POSTOP FOLLOW-UP VISIT: CPT

## 2023-09-01 NOTE — OPERATIVE REPORT - OPERATIVE RPOSRT DETAILS
Patient Name: LUIS DANIEL PENA    Medical Record Number: [id number]    DATE OF SURGERY: AUGUST 30, 2023    OPERATING SURGEON: ALPHONSO RAIN M.D.    ASSISTANT SURGEON: JOSIE WALLACE D.O.    ANESTHESIA: MONITORED ANESTHESIA CARE & TOPICAL.    PREOPERATIVE DIAGNOSIS: CATARACT, PUPILLARY ABNORMALITY, RIGHT EYE.     POSTOPERATIVE DIAGNOSIS: CATARACT, PUPILLARY ABNORMALITY, RIGHT EYE.     OPERATIVE PROCEDURE: CATARACT EXTRACTION, INTRAOCULAR LENS IMPLANTATION, RIGHT EYE     COMPLICATIONS: NONE.    SPECIMEN: NONE.    ESTIMATED BLOOD LOSS: <1 cc    PATIENT CONDITION: STABLE.    PROCEDURE:  Prior to the procedure, all risks, benefits and alternatives were discussed with the patient, including but not limited to infection, bleeding, retinal detachment, increase or decrease in intraocular pressure, corneal edema, corneal decompensation, ptosis, diplopia, loss of vision, no improvement of vision, need for second surgery, macular edema, intraocular inflammation, etc. All questions were answered and the patient wished to proceed with the surgery. Informed consent was obtained.    The patient was wheeled to the operating room and placed on the operating table in a supine position. Next, the right eye was prepped and draped in the usual sterile fashion for intraocular surgery. An eyelid speculum was placed into the right eye.    A small pupil was noted. Inferotemporal and superotemporal paracenteses were created using a 20 gauge MVR blade, followed by Trypan blue injection into the anterior chamber. Trypan blue was washed out using balanced salt solution. Viscoat was injected into the anterior chamber. A temporal biplanar clear corneal incision was then created using a 2.4-mm keratome. Next, a continuous curvilinear capsulorrhexis was created with a 27 gauge needle cystotome. Balanced salt solution hydrodissection and hydrodelineation were performed. The lens was phacoemulsified using a divide-and-conquer technique. Five iris hooks were used to dilate the pupil. Residual epinucleus and cortical material were removed using a double-handpiece irrigation & aspiration device.    Healon was injected into the bag. A 25.5-diopter ANIRUDH Tecnis ZCB00 lens was inserted into the capsular bag and rotated into place. Iris hooks were removed. The viscoelastic was then removed using irrigation and aspiration. The wounds were hydrated. At the end of the procedure, the anterior chamber was well formed. The intraocular pressure was in the mid-teens by palpation. All wounds were water-tight with no leakage. Cefazolin and dexamethasone were applied on the cornea and conjunctiva. The eyelid speculum was removed. Topical antibiotic and steroid ointment was placed onto the right eye, which was then patched and shielded. The patient was wheeled to the recovery room in a stable and excellent condition.

## 2023-09-06 NOTE — OPERATIVE REPORT - OPERATIVE RPOSRT DETAILS
Patient Name: LUIS DANIEL PENA    Medical Record Number: [id number]    DATE OF SURGERY: AUGUST 30, 2023    OPERATING SURGEON: ALPHONSO RAIN M.D.    ASSISTANT SURGEON: JOSIE WALLACE D.O.    ANESTHESIA: MONITORED ANESTHESIA CARE & TOPICAL.    PREOPERATIVE DIAGNOSIS: CATARACT, PUPILLARY ABNORMALITY, RIGHT EYE.     POSTOPERATIVE DIAGNOSIS: CATARACT, PUPILLARY ABNORMALITY, RIGHT EYE.     OPERATIVE PROCEDURE: CATARACT EXTRACTION, INTRAOCULAR LENS IMPLANTATION, RIGHT EYE     COMPLICATIONS: NONE.    SPECIMEN: NONE.    ESTIMATED BLOOD LOSS: <1 cc    PATIENT CONDITION: STABLE.    PROCEDURE:  Prior to the procedure, all risks, benefits and alternatives were discussed with the patient, including but not limited to infection, bleeding, retinal detachment, increase or decrease in intraocular pressure, corneal edema, corneal decompensation, ptosis, diplopia, loss of vision, no improvement of vision, need for second surgery, macular edema, intraocular inflammation, etc. All questions were answered and the patient wished to proceed with the surgery. Informed consent was obtained.    The patient was wheeled to the operating room and placed on the operating table in a supine position. Next, the right eye was prepped and draped in the usual sterile fashion for intraocular surgery. An eyelid speculum was placed into the right eye.    Inferotemporal and superotemporal paracenteses were created using a 20 gauge MVR blade, followed by injection of Trypan blue. Trypan blue was washed out using balanced salt solution. Viscoat was injected into the anterior chamber. A temporal biplanar clear corneal incision was then created using a 2.4-mm keratome. Next, a continuous curvilinear capsulorrhexis was created with a 27 gauge needle cystotome. Balanced salt solution hydrodissection and hydrodelineation were performed. The lens was phacoemulsified using a divide-and-conquer technique. Five iris hooks were used to dilate the pupil in the middle of phacoemulsification. Residual epinucleus and cortical material were removed using a double-handpiece irrigation & aspiration device.    Healon was injected into the bag. A 25.5-diopter ANIRUDH Tecnis ZCB00 lens was inserted into the capsular bag and rotated into place. Iris hooks were removed. The viscoelastic was then removed using irrigation and aspiration. The wounds were hydrated. At the end of the procedure, the anterior chamber was well formed. The intraocular pressure was in the mid-teens by palpation. All wounds were water-tight with no leakage. Cefazolin and dexamethasone were applied on the cornea and conjunctiva. The eyelid speculum was removed. Topical antibiotic and steroid ointment was placed onto the right eye, which was then patched and shielded. The patient was wheeled to the recovery room in a stable and excellent condition.

## 2023-09-19 ENCOUNTER — NON-APPOINTMENT (OUTPATIENT)
Age: 73
End: 2023-09-19

## 2023-09-19 ENCOUNTER — APPOINTMENT (OUTPATIENT)
Dept: PULMONOLOGY | Facility: CLINIC | Age: 73
End: 2023-09-19
Payer: MEDICARE

## 2023-09-19 ENCOUNTER — APPOINTMENT (OUTPATIENT)
Dept: OPHTHALMOLOGY | Facility: CLINIC | Age: 73
End: 2023-09-19
Payer: MEDICARE

## 2023-09-19 VITALS — WEIGHT: 146 LBS | HEIGHT: 61 IN | BODY MASS INDEX: 27.56 KG/M2

## 2023-09-19 DIAGNOSIS — Z87.891 PERSONAL HISTORY OF NICOTINE DEPENDENCE: ICD-10-CM

## 2023-09-19 PROCEDURE — 99024 POSTOP FOLLOW-UP VISIT: CPT

## 2023-09-19 PROCEDURE — G0296 VISIT TO DETERM LDCT ELIG: CPT

## 2023-10-04 ENCOUNTER — APPOINTMENT (OUTPATIENT)
Dept: MAMMOGRAPHY | Facility: HOSPITAL | Age: 73
End: 2023-10-04

## 2023-10-04 ENCOUNTER — APPOINTMENT (OUTPATIENT)
Dept: CT IMAGING | Facility: HOSPITAL | Age: 73
End: 2023-10-04

## 2023-10-04 ENCOUNTER — APPOINTMENT (OUTPATIENT)
Dept: RADIOLOGY | Facility: HOSPITAL | Age: 73
End: 2023-10-04

## 2023-10-20 ENCOUNTER — APPOINTMENT (OUTPATIENT)
Dept: OPHTHALMOLOGY | Facility: CLINIC | Age: 73
End: 2023-10-20

## 2023-10-23 ENCOUNTER — APPOINTMENT (OUTPATIENT)
Dept: OPHTHALMOLOGY | Facility: CLINIC | Age: 73
End: 2023-10-23
Payer: MEDICARE

## 2023-10-23 ENCOUNTER — NON-APPOINTMENT (OUTPATIENT)
Age: 73
End: 2023-10-23

## 2023-10-23 PROCEDURE — 99024 POSTOP FOLLOW-UP VISIT: CPT

## 2023-10-27 ENCOUNTER — APPOINTMENT (OUTPATIENT)
Dept: OPHTHALMOLOGY | Facility: CLINIC | Age: 73
End: 2023-10-27
Payer: SELF-PAY

## 2023-10-27 ENCOUNTER — NON-APPOINTMENT (OUTPATIENT)
Age: 73
End: 2023-10-27

## 2023-10-27 PROCEDURE — 92015 DETERMINE REFRACTIVE STATE: CPT

## 2023-10-30 ENCOUNTER — APPOINTMENT (OUTPATIENT)
Dept: OPHTHALMOLOGY | Facility: CLINIC | Age: 73
End: 2023-10-30
Payer: MEDICARE

## 2023-10-30 ENCOUNTER — NON-APPOINTMENT (OUTPATIENT)
Age: 73
End: 2023-10-30

## 2023-10-30 PROCEDURE — 92014 COMPRE OPH EXAM EST PT 1/>: CPT | Mod: 24

## 2023-11-02 ENCOUNTER — APPOINTMENT (OUTPATIENT)
Dept: OPHTHALMOLOGY | Facility: CLINIC | Age: 73
End: 2023-11-02
Payer: MEDICARE

## 2023-11-02 ENCOUNTER — NON-APPOINTMENT (OUTPATIENT)
Age: 73
End: 2023-11-02

## 2023-11-02 PROCEDURE — 92012 INTRM OPH EXAM EST PATIENT: CPT | Mod: 24

## 2023-11-06 ENCOUNTER — RX RENEWAL (OUTPATIENT)
Age: 73
End: 2023-11-06

## 2023-11-06 ENCOUNTER — APPOINTMENT (OUTPATIENT)
Dept: OPHTHALMOLOGY | Facility: CLINIC | Age: 73
End: 2023-11-06
Payer: MEDICARE

## 2023-11-06 ENCOUNTER — NON-APPOINTMENT (OUTPATIENT)
Age: 73
End: 2023-11-06

## 2023-11-06 PROCEDURE — 92250 FUNDUS PHOTOGRAPHY W/I&R: CPT

## 2023-11-06 PROCEDURE — 92012 INTRM OPH EXAM EST PATIENT: CPT | Mod: 24

## 2023-11-20 ENCOUNTER — NON-APPOINTMENT (OUTPATIENT)
Age: 73
End: 2023-11-20

## 2023-11-20 ENCOUNTER — APPOINTMENT (OUTPATIENT)
Dept: OPHTHALMOLOGY | Facility: CLINIC | Age: 73
End: 2023-11-20
Payer: MEDICARE

## 2023-11-20 PROCEDURE — 92012 INTRM OPH EXAM EST PATIENT: CPT | Mod: 24

## 2023-12-04 ENCOUNTER — APPOINTMENT (OUTPATIENT)
Dept: HEART AND VASCULAR | Facility: CLINIC | Age: 73
End: 2023-12-04
Payer: MEDICARE

## 2023-12-04 VITALS
SYSTOLIC BLOOD PRESSURE: 155 MMHG | DIASTOLIC BLOOD PRESSURE: 63 MMHG | HEIGHT: 61 IN | HEART RATE: 56 BPM | BODY MASS INDEX: 27.38 KG/M2 | WEIGHT: 145 LBS

## 2023-12-04 VITALS — SYSTOLIC BLOOD PRESSURE: 154 MMHG | DIASTOLIC BLOOD PRESSURE: 68 MMHG

## 2023-12-04 PROCEDURE — 99214 OFFICE O/P EST MOD 30 MIN: CPT | Mod: 25

## 2023-12-04 PROCEDURE — 93000 ELECTROCARDIOGRAM COMPLETE: CPT

## 2023-12-05 ENCOUNTER — APPOINTMENT (OUTPATIENT)
Dept: INTERNAL MEDICINE | Facility: CLINIC | Age: 73
End: 2023-12-05
Payer: MEDICARE

## 2023-12-05 VITALS — DIASTOLIC BLOOD PRESSURE: 72 MMHG | SYSTOLIC BLOOD PRESSURE: 151 MMHG

## 2023-12-05 VITALS
HEIGHT: 61 IN | HEART RATE: 56 BPM | OXYGEN SATURATION: 98 % | BODY MASS INDEX: 27.85 KG/M2 | WEIGHT: 147.5 LBS | DIASTOLIC BLOOD PRESSURE: 69 MMHG | SYSTOLIC BLOOD PRESSURE: 167 MMHG | TEMPERATURE: 98 F

## 2023-12-05 VITALS — DIASTOLIC BLOOD PRESSURE: 66 MMHG | SYSTOLIC BLOOD PRESSURE: 145 MMHG

## 2023-12-05 DIAGNOSIS — R91.1 SOLITARY PULMONARY NODULE: ICD-10-CM

## 2023-12-05 DIAGNOSIS — F41.9 ANXIETY DISORDER, UNSPECIFIED: ICD-10-CM

## 2023-12-05 DIAGNOSIS — R82.90 UNSPECIFIED ABNORMAL FINDINGS IN URINE: ICD-10-CM

## 2023-12-05 DIAGNOSIS — I10 ESSENTIAL (PRIMARY) HYPERTENSION: ICD-10-CM

## 2023-12-05 DIAGNOSIS — D64.9 ANEMIA, UNSPECIFIED: ICD-10-CM

## 2023-12-05 PROCEDURE — 99214 OFFICE O/P EST MOD 30 MIN: CPT | Mod: 25

## 2023-12-05 PROCEDURE — 36415 COLL VENOUS BLD VENIPUNCTURE: CPT

## 2023-12-05 RX ORDER — TIMOLOL MALEATE 5 MG/ML
0.5 SOLUTION OPHTHALMIC
Qty: 10 | Refills: 0 | Status: ACTIVE | COMMUNITY
Start: 2023-11-02

## 2023-12-05 RX ORDER — BRIMONIDINE TARTRATE 2 MG/MG
0.2 SOLUTION/ DROPS OPHTHALMIC
Qty: 10 | Refills: 0 | Status: COMPLETED | COMMUNITY
Start: 2023-11-02

## 2023-12-05 RX ORDER — OFLOXACIN 3 MG/ML
0.3 SOLUTION/ DROPS OPHTHALMIC
Qty: 10 | Refills: 0 | Status: COMPLETED | COMMUNITY
Start: 2023-08-17

## 2023-12-05 RX ORDER — LOSARTAN POTASSIUM 100 MG/1
100 TABLET, FILM COATED ORAL
Qty: 90 | Refills: 1 | Status: COMPLETED | COMMUNITY
Start: 2022-04-04 | End: 2023-12-05

## 2023-12-05 RX ORDER — PREDNISOLONE ACETATE 10 MG/ML
1 SUSPENSION/ DROPS OPHTHALMIC
Qty: 5 | Refills: 0 | Status: ACTIVE | COMMUNITY
Start: 2023-05-02

## 2023-12-05 RX ORDER — HYDROCHLOROTHIAZIDE 25 MG/1
25 TABLET ORAL
Qty: 90 | Refills: 1 | Status: COMPLETED | COMMUNITY
Start: 2022-04-28 | End: 2023-12-05

## 2023-12-05 RX ORDER — CLINDAMYCIN HYDROCHLORIDE 300 MG/1
300 CAPSULE ORAL
Qty: 28 | Refills: 0 | Status: COMPLETED | COMMUNITY
Start: 2023-07-05

## 2023-12-05 RX ORDER — HYDROCHLOROTHIAZIDE 12.5 MG/1
12.5 TABLET ORAL
Qty: 90 | Refills: 0 | Status: COMPLETED | COMMUNITY
Start: 2023-04-03

## 2023-12-05 RX ORDER — IBUPROFEN 800 MG/1
800 TABLET, FILM COATED ORAL
Qty: 21 | Refills: 0 | Status: COMPLETED | COMMUNITY
Start: 2023-07-05

## 2023-12-07 ENCOUNTER — TRANSCRIPTION ENCOUNTER (OUTPATIENT)
Age: 73
End: 2023-12-07

## 2023-12-08 DIAGNOSIS — B95.1 URINARY TRACT INFECTION, SITE NOT SPECIFIED: ICD-10-CM

## 2023-12-08 DIAGNOSIS — N39.0 URINARY TRACT INFECTION, SITE NOT SPECIFIED: ICD-10-CM

## 2023-12-08 LAB
ALBUMIN SERPL ELPH-MCNC: 3.9 G/DL
ALP BLD-CCNC: 65 U/L
ALT SERPL-CCNC: 9 U/L
ANION GAP SERPL CALC-SCNC: 9 MMOL/L
APPEARANCE: CLEAR
AST SERPL-CCNC: 19 U/L
BACTERIA UR CULT: ABNORMAL
BACTERIA: ABNORMAL /HPF
BASOPHILS # BLD AUTO: 0.03 K/UL
BASOPHILS NFR BLD AUTO: 0.5 %
BILIRUB SERPL-MCNC: 0.2 MG/DL
BILIRUBIN URINE: NEGATIVE
BLOOD URINE: NEGATIVE
BUN SERPL-MCNC: 20 MG/DL
CALCIUM SERPL-MCNC: 9.4 MG/DL
CAST: 0 /LPF
CHLORIDE SERPL-SCNC: 99 MMOL/L
CO2 SERPL-SCNC: 27 MMOL/L
COLOR: NORMAL
CREAT SERPL-MCNC: 0.55 MG/DL
EGFR: 97 ML/MIN/1.73M2
EOSINOPHIL # BLD AUTO: 0.05 K/UL
EOSINOPHIL NFR BLD AUTO: 0.9 %
EPITHELIAL CELLS: 13 /HPF
GLUCOSE QUALITATIVE U: NEGATIVE MG/DL
GLUCOSE SERPL-MCNC: 128 MG/DL
HCT VFR BLD CALC: 34.7 %
HGB BLD-MCNC: 11.1 G/DL
IMM GRANULOCYTES NFR BLD AUTO: 0.2 %
KETONES URINE: ABNORMAL MG/DL
LEUKOCYTE ESTERASE URINE: NEGATIVE
LYMPHOCYTES # BLD AUTO: 1.33 K/UL
LYMPHOCYTES NFR BLD AUTO: 23.7 %
MAN DIFF?: NORMAL
MCHC RBC-ENTMCNC: 29.8 PG
MCHC RBC-ENTMCNC: 32 GM/DL
MCV RBC AUTO: 93 FL
MICROSCOPIC-UA: NORMAL
MONOCYTES # BLD AUTO: 0.48 K/UL
MONOCYTES NFR BLD AUTO: 8.6 %
NEUTROPHILS # BLD AUTO: 3.71 K/UL
NEUTROPHILS NFR BLD AUTO: 66.1 %
NITRITE URINE: NEGATIVE
PH URINE: 7.5
PLATELET # BLD AUTO: 168 K/UL
POTASSIUM SERPL-SCNC: 4 MMOL/L
PROT SERPL-MCNC: 7 G/DL
PROTEIN URINE: NEGATIVE MG/DL
RBC # BLD: 3.73 M/UL
RBC # FLD: 13.5 %
RED BLOOD CELLS URINE: 10 /HPF
REVIEW: NORMAL
SODIUM SERPL-SCNC: 134 MMOL/L
SPECIFIC GRAVITY URINE: 1.02
UROBILINOGEN URINE: 0.2 MG/DL
WBC # FLD AUTO: 5.61 K/UL
WHITE BLOOD CELLS URINE: 1 /HPF

## 2023-12-08 RX ORDER — AMPICILLIN 500 MG/1
500 CAPSULE ORAL 4 TIMES DAILY
Qty: 20 | Refills: 0 | Status: ACTIVE | COMMUNITY
Start: 2023-12-08 | End: 1900-01-01

## 2023-12-14 ENCOUNTER — APPOINTMENT (OUTPATIENT)
Dept: OPHTHALMOLOGY | Facility: CLINIC | Age: 73
End: 2023-12-14

## 2023-12-15 ENCOUNTER — TRANSCRIPTION ENCOUNTER (OUTPATIENT)
Age: 73
End: 2023-12-15

## 2023-12-18 ENCOUNTER — APPOINTMENT (OUTPATIENT)
Dept: INTERNAL MEDICINE | Facility: CLINIC | Age: 73
End: 2023-12-18

## 2023-12-22 ENCOUNTER — TRANSCRIPTION ENCOUNTER (OUTPATIENT)
Age: 73
End: 2023-12-22

## 2023-12-26 ENCOUNTER — APPOINTMENT (OUTPATIENT)
Dept: INTERNAL MEDICINE | Facility: CLINIC | Age: 73
End: 2023-12-26

## 2023-12-28 ENCOUNTER — NON-APPOINTMENT (OUTPATIENT)
Age: 73
End: 2023-12-28

## 2023-12-28 ENCOUNTER — APPOINTMENT (OUTPATIENT)
Dept: OPHTHALMOLOGY | Facility: CLINIC | Age: 73
End: 2023-12-28
Payer: MEDICARE

## 2023-12-28 PROCEDURE — 92012 INTRM OPH EXAM EST PATIENT: CPT

## 2024-01-16 ENCOUNTER — APPOINTMENT (OUTPATIENT)
Dept: INTERNAL MEDICINE | Facility: CLINIC | Age: 74
End: 2024-01-16

## 2024-01-22 ENCOUNTER — APPOINTMENT (OUTPATIENT)
Dept: OPHTHALMOLOGY | Facility: CLINIC | Age: 74
End: 2024-01-22
Payer: MEDICARE

## 2024-01-22 ENCOUNTER — APPOINTMENT (OUTPATIENT)
Dept: INTERNAL MEDICINE | Facility: CLINIC | Age: 74
End: 2024-01-22
Payer: MEDICARE

## 2024-01-22 ENCOUNTER — NON-APPOINTMENT (OUTPATIENT)
Age: 74
End: 2024-01-22

## 2024-01-22 DIAGNOSIS — R31.9 HEMATURIA, UNSPECIFIED: ICD-10-CM

## 2024-01-22 DIAGNOSIS — E87.1 HYPO-OSMOLALITY AND HYPONATREMIA: ICD-10-CM

## 2024-01-22 PROCEDURE — 92014 COMPRE OPH EXAM EST PT 1/>: CPT

## 2024-01-22 PROCEDURE — 36415 COLL VENOUS BLD VENIPUNCTURE: CPT

## 2024-01-22 PROCEDURE — 92133 CPTRZD OPH DX IMG PST SGM ON: CPT

## 2024-01-22 RX ORDER — HYDROCHLOROTHIAZIDE 25 MG/1
25 TABLET ORAL DAILY
Qty: 90 | Refills: 1 | Status: ACTIVE | COMMUNITY
Start: 2024-01-22 | End: 1900-01-01

## 2024-01-22 RX ORDER — SPIRONOLACTONE 25 MG/1
25 TABLET ORAL
Qty: 90 | Refills: 1 | Status: ACTIVE | COMMUNITY
Start: 2022-05-05 | End: 1900-01-01

## 2024-01-22 RX ORDER — LOSARTAN POTASSIUM AND HYDROCHLOROTHIAZIDE 25; 100 MG/1; MG/1
100-25 TABLET ORAL DAILY
Qty: 90 | Refills: 2 | Status: COMPLETED | COMMUNITY
Start: 2023-12-05 | End: 2024-01-22

## 2024-01-22 RX ORDER — ROSUVASTATIN CALCIUM 10 MG/1
10 TABLET, FILM COATED ORAL
Qty: 90 | Refills: 1 | Status: ACTIVE | COMMUNITY
Start: 2023-05-09 | End: 1900-01-01

## 2024-01-22 RX ORDER — LOSARTAN POTASSIUM 100 MG/1
100 TABLET, FILM COATED ORAL
Qty: 90 | Refills: 3 | Status: ACTIVE | COMMUNITY
Start: 2024-01-22 | End: 1900-01-01

## 2024-01-22 RX ORDER — CARVEDILOL 25 MG/1
25 TABLET, FILM COATED ORAL
Qty: 180 | Refills: 1 | Status: ACTIVE | COMMUNITY
Start: 2022-01-31 | End: 1900-01-01

## 2024-01-24 LAB
ANION GAP SERPL CALC-SCNC: 14 MMOL/L
APPEARANCE: CLEAR
BACTERIA: NEGATIVE /HPF
BILIRUBIN URINE: NEGATIVE
BLOOD URINE: ABNORMAL
BUN SERPL-MCNC: 20 MG/DL
CALCIUM SERPL-MCNC: 9.8 MG/DL
CAST: 0 /LPF
CHLORIDE SERPL-SCNC: 97 MMOL/L
CO2 SERPL-SCNC: 27 MMOL/L
COLOR: YELLOW
CREAT SERPL-MCNC: 0.66 MG/DL
EGFR: 93 ML/MIN/1.73M2
EPITHELIAL CELLS: 8 /HPF
GLUCOSE QUALITATIVE U: NEGATIVE MG/DL
GLUCOSE SERPL-MCNC: 135 MG/DL
HCT VFR BLD CALC: 35.4 %
HGB BLD-MCNC: 11 G/DL
KETONES URINE: NEGATIVE MG/DL
LEUKOCYTE ESTERASE URINE: ABNORMAL
MICROSCOPIC-UA: NORMAL
NITRITE URINE: NEGATIVE
PH URINE: 8
POTASSIUM SERPL-SCNC: 4.5 MMOL/L
PROTEIN URINE: NEGATIVE MG/DL
RED BLOOD CELLS URINE: 7 /HPF
SODIUM SERPL-SCNC: 138 MMOL/L
SPECIFIC GRAVITY URINE: 1.01
UROBILINOGEN URINE: 0.2 MG/DL
WHITE BLOOD CELLS URINE: 0 /HPF

## 2024-07-23 ENCOUNTER — RX RENEWAL (OUTPATIENT)
Age: 74
End: 2024-07-23

## 2024-09-23 ENCOUNTER — APPOINTMENT (OUTPATIENT)
Dept: INTERNAL MEDICINE | Facility: CLINIC | Age: 74
End: 2024-09-23

## 2024-10-28 ENCOUNTER — RX RENEWAL (OUTPATIENT)
Age: 74
End: 2024-10-28

## 2024-10-28 ENCOUNTER — APPOINTMENT (OUTPATIENT)
Dept: INTERNAL MEDICINE | Facility: CLINIC | Age: 74
End: 2024-10-28
Payer: MEDICARE

## 2024-10-28 ENCOUNTER — NON-APPOINTMENT (OUTPATIENT)
Age: 74
End: 2024-10-28

## 2024-10-28 VITALS
BODY MASS INDEX: 28.02 KG/M2 | OXYGEN SATURATION: 100 % | SYSTOLIC BLOOD PRESSURE: 173 MMHG | TEMPERATURE: 98.9 F | HEART RATE: 62 BPM | WEIGHT: 148.44 LBS | HEIGHT: 61 IN | DIASTOLIC BLOOD PRESSURE: 68 MMHG

## 2024-10-28 VITALS — DIASTOLIC BLOOD PRESSURE: 63 MMHG | SYSTOLIC BLOOD PRESSURE: 146 MMHG

## 2024-10-28 DIAGNOSIS — G89.29 PAIN IN RIGHT KNEE: ICD-10-CM

## 2024-10-28 DIAGNOSIS — S09.90XA UNSPECIFIED INJURY OF HEAD, INITIAL ENCOUNTER: ICD-10-CM

## 2024-10-28 DIAGNOSIS — Z23 ENCOUNTER FOR IMMUNIZATION: ICD-10-CM

## 2024-10-28 DIAGNOSIS — Z01.818 ENCOUNTER FOR OTHER PREPROCEDURAL EXAMINATION: ICD-10-CM
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
## 2024-10-28 DIAGNOSIS — M25.561 PAIN IN RIGHT KNEE: ICD-10-CM

## 2024-10-28 DIAGNOSIS — I10 ESSENTIAL (PRIMARY) HYPERTENSION: ICD-10-CM

## 2024-10-28 PROCEDURE — 36415 COLL VENOUS BLD VENIPUNCTURE: CPT

## 2024-10-28 PROCEDURE — 99214 OFFICE O/P EST MOD 30 MIN: CPT

## 2024-10-28 PROCEDURE — 93000 ELECTROCARDIOGRAM COMPLETE: CPT

## 2024-10-28 PROCEDURE — G2211 COMPLEX E/M VISIT ADD ON: CPT

## 2024-10-28 RX ORDER — VALSARTAN 80 MG/1
80 TABLET, COATED ORAL
Qty: 90 | Refills: 0 | Status: ACTIVE | COMMUNITY
Start: 2024-10-28 | End: 1900-01-01

## 2024-10-29 PROBLEM — S09.90XA HEAD TRAUMA: Status: ACTIVE | Noted: 2024-10-28
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
## 2024-10-29 PROBLEM — Z23 ENCOUNTER FOR IMMUNIZATION: Status: RESOLVED | Noted: 2022-05-31 | Resolved: 2024-10-29

## 2024-10-29 PROBLEM — Z01.818 PREOP EXAMINATION: Status: RESOLVED | Noted: 2023-04-17 | Resolved: 2024-10-29

## 2024-10-31 DIAGNOSIS — E87.6 HYPOKALEMIA: ICD-10-CM

## 2024-10-31 LAB
ALBUMIN SERPL ELPH-MCNC: 4 G/DL
ALP BLD-CCNC: 88 U/L
ALT SERPL-CCNC: 11 U/L
ANION GAP SERPL CALC-SCNC: 15 MMOL/L
AST SERPL-CCNC: 25 U/L
BASOPHILS # BLD AUTO: 0.04 K/UL
BASOPHILS NFR BLD AUTO: 0.7 %
BILIRUB SERPL-MCNC: 0.2 MG/DL
BUN SERPL-MCNC: 22 MG/DL
CALCIUM SERPL-MCNC: 9.3 MG/DL
CHLORIDE SERPL-SCNC: 92 MMOL/L
CHOLEST SERPL-MCNC: 172 MG/DL
CO2 SERPL-SCNC: 25 MMOL/L
CREAT SERPL-MCNC: 0.63 MG/DL
EGFR: 94 ML/MIN/1.73M2
EOSINOPHIL # BLD AUTO: 0.03 K/UL
EOSINOPHIL NFR BLD AUTO: 0.5 %
ESTIMATED AVERAGE GLUCOSE: 131 MG/DL
FERRITIN SERPL-MCNC: 84 NG/ML
FOLATE SERPL-MCNC: 5.2 NG/ML
GLUCOSE SERPL-MCNC: 87 MG/DL
HBA1C MFR BLD HPLC: 6.2 %
HCT VFR BLD CALC: 33.8 %
HDLC SERPL-MCNC: 72 MG/DL
HGB BLD-MCNC: 10.8 G/DL
IMM GRANULOCYTES NFR BLD AUTO: 0.2 %
IRON SATN MFR SERPL: 11 %
IRON SERPL-MCNC: 32 UG/DL
LDLC SERPL CALC-MCNC: 86 MG/DL
LYMPHOCYTES # BLD AUTO: 1.25 K/UL
LYMPHOCYTES NFR BLD AUTO: 22.9 %
MAN DIFF?: NORMAL
MCHC RBC-ENTMCNC: 28.5 PG
MCHC RBC-ENTMCNC: 32 GM/DL
MCV RBC AUTO: 89.2 FL
MONOCYTES # BLD AUTO: 0.72 K/UL
MONOCYTES NFR BLD AUTO: 13.2 %
NEUTROPHILS # BLD AUTO: 3.42 K/UL
NEUTROPHILS NFR BLD AUTO: 62.5 %
NONHDLC SERPL-MCNC: 100 MG/DL
PLATELET # BLD AUTO: 163 K/UL
POTASSIUM SERPL-SCNC: 3.3 MMOL/L
PROT SERPL-MCNC: 7.2 G/DL
RBC # BLD: 3.79 M/UL
RBC # FLD: 14.3 %
SODIUM SERPL-SCNC: 133 MMOL/L
TIBC SERPL-MCNC: 291 UG/DL
TRIGL SERPL-MCNC: 72 MG/DL
TSH SERPL-ACNC: 1.73 UIU/ML
UIBC SERPL-MCNC: 259 UG/DL
VIT B12 SERPL-MCNC: 531 PG/ML
WBC # FLD AUTO: 5.47 K/UL

## 2024-11-05 LAB
ANION GAP SERPL CALC-SCNC: 11 MMOL/L
BUN SERPL-MCNC: 20 MG/DL
CALCIUM SERPL-MCNC: 9.6 MG/DL
CHLORIDE SERPL-SCNC: 101 MMOL/L
CO2 SERPL-SCNC: 31 MMOL/L
CREAT SERPL-MCNC: 0.6 MG/DL
EGFR: 95 ML/MIN/1.73M2
GLUCOSE SERPL-MCNC: 151 MG/DL
POTASSIUM SERPL-SCNC: 4.2 MMOL/L
SODIUM SERPL-SCNC: 143 MMOL/L

## 2024-11-11 ENCOUNTER — APPOINTMENT (OUTPATIENT)
Dept: OPHTHALMOLOGY | Facility: CLINIC | Age: 74
End: 2024-11-11
Payer: MEDICARE

## 2024-11-11 ENCOUNTER — NON-APPOINTMENT (OUTPATIENT)
Age: 74
End: 2024-11-11

## 2024-11-11 PROCEDURE — 92014 COMPRE OPH EXAM EST PT 1/>: CPT

## 2024-11-11 PROCEDURE — 92133 CPTRZD OPH DX IMG PST SGM ON: CPT

## 2024-12-03 ENCOUNTER — APPOINTMENT (OUTPATIENT)
Dept: HEART AND VASCULAR | Facility: CLINIC | Age: 74
End: 2024-12-03
Payer: MEDICARE

## 2024-12-03 VITALS — DIASTOLIC BLOOD PRESSURE: 70 MMHG | SYSTOLIC BLOOD PRESSURE: 135 MMHG

## 2024-12-03 VITALS
OXYGEN SATURATION: 98 % | WEIGHT: 151 LBS | BODY MASS INDEX: 28.51 KG/M2 | SYSTOLIC BLOOD PRESSURE: 140 MMHG | DIASTOLIC BLOOD PRESSURE: 75 MMHG | HEART RATE: 62 BPM | HEIGHT: 61 IN

## 2024-12-03 PROCEDURE — G2211 COMPLEX E/M VISIT ADD ON: CPT

## 2024-12-03 PROCEDURE — 99214 OFFICE O/P EST MOD 30 MIN: CPT

## 2024-12-12 ENCOUNTER — APPOINTMENT (OUTPATIENT)
Dept: OPHTHALMOLOGY | Facility: CLINIC | Age: 74
End: 2024-12-12
Payer: MEDICARE

## 2024-12-12 ENCOUNTER — OUTPATIENT (OUTPATIENT)
Dept: OUTPATIENT SERVICES | Facility: HOSPITAL | Age: 74
LOS: 1 days | End: 2024-12-12

## 2024-12-12 ENCOUNTER — NON-APPOINTMENT (OUTPATIENT)
Age: 74
End: 2024-12-12

## 2024-12-12 DIAGNOSIS — Z98.890 OTHER SPECIFIED POSTPROCEDURAL STATES: Chronic | ICD-10-CM

## 2024-12-12 DIAGNOSIS — Z98.49 CATARACT EXTRACTION STATUS, UNSPECIFIED EYE: Chronic | ICD-10-CM

## 2024-12-12 PROCEDURE — 66821 AFTER CATARACT LASER SURGERY: CPT | Mod: LT

## 2024-12-18 ENCOUNTER — APPOINTMENT (OUTPATIENT)
Dept: HEART AND VASCULAR | Facility: CLINIC | Age: 74
End: 2024-12-18
Payer: MEDICARE

## 2024-12-18 VITALS — SYSTOLIC BLOOD PRESSURE: 179 MMHG | DIASTOLIC BLOOD PRESSURE: 73 MMHG

## 2024-12-18 VITALS — DIASTOLIC BLOOD PRESSURE: 83 MMHG | OXYGEN SATURATION: 96 % | HEART RATE: 66 BPM | SYSTOLIC BLOOD PRESSURE: 199 MMHG

## 2024-12-18 VITALS — SYSTOLIC BLOOD PRESSURE: 162 MMHG | DIASTOLIC BLOOD PRESSURE: 70 MMHG

## 2024-12-18 DIAGNOSIS — H26.492 OTHER SECONDARY CATARACT, LEFT EYE: ICD-10-CM

## 2024-12-18 DIAGNOSIS — I35.0 NONRHEUMATIC AORTIC (VALVE) STENOSIS: ICD-10-CM

## 2024-12-18 PROCEDURE — 93306 TTE W/DOPPLER COMPLETE: CPT

## 2025-01-10 ENCOUNTER — APPOINTMENT (OUTPATIENT)
Dept: OPHTHALMOLOGY | Facility: CLINIC | Age: 75
End: 2025-01-10

## 2025-01-13 ENCOUNTER — APPOINTMENT (OUTPATIENT)
Dept: OPHTHALMOLOGY | Facility: CLINIC | Age: 75
End: 2025-01-13
Payer: MEDICARE

## 2025-01-13 ENCOUNTER — NON-APPOINTMENT (OUTPATIENT)
Age: 75
End: 2025-01-13

## 2025-01-13 PROCEDURE — 99024 POSTOP FOLLOW-UP VISIT: CPT

## 2025-03-11 ENCOUNTER — APPOINTMENT (OUTPATIENT)
Dept: INTERNAL MEDICINE | Facility: CLINIC | Age: 75
End: 2025-03-11
Payer: MEDICARE

## 2025-03-11 VITALS
DIASTOLIC BLOOD PRESSURE: 55 MMHG | OXYGEN SATURATION: 98 % | TEMPERATURE: 97.6 F | HEIGHT: 61 IN | WEIGHT: 148.13 LBS | SYSTOLIC BLOOD PRESSURE: 157 MMHG | HEART RATE: 67 BPM | BODY MASS INDEX: 27.97 KG/M2

## 2025-03-11 DIAGNOSIS — I10 ESSENTIAL (PRIMARY) HYPERTENSION: ICD-10-CM

## 2025-03-11 DIAGNOSIS — B95.1 URINARY TRACT INFECTION, SITE NOT SPECIFIED: ICD-10-CM

## 2025-03-11 DIAGNOSIS — D64.9 ANEMIA, UNSPECIFIED: ICD-10-CM

## 2025-03-11 DIAGNOSIS — K21.9 GASTRO-ESOPHAGEAL REFLUX DISEASE W/OUT ESOPHAGITIS: ICD-10-CM

## 2025-03-11 DIAGNOSIS — N39.0 URINARY TRACT INFECTION, SITE NOT SPECIFIED: ICD-10-CM

## 2025-03-11 DIAGNOSIS — R31.9 HEMATURIA, UNSPECIFIED: ICD-10-CM

## 2025-03-11 PROCEDURE — 99214 OFFICE O/P EST MOD 30 MIN: CPT | Mod: 25

## 2025-03-11 RX ORDER — LANSOPRAZOLE 30 MG/1
30 CAPSULE, DELAYED RELEASE ORAL DAILY
Qty: 90 | Refills: 3 | Status: ACTIVE | COMMUNITY
Start: 2025-03-11 | End: 1900-01-01

## 2025-03-11 RX ORDER — CARVEDILOL 12.5 MG/1
12.5 TABLET, FILM COATED ORAL
Qty: 60 | Refills: 3 | Status: ACTIVE | COMMUNITY
Start: 2025-03-11 | End: 1900-01-01

## 2025-03-14 LAB
ALBUMIN SERPL ELPH-MCNC: 3.6 G/DL
ALP BLD-CCNC: 72 U/L
ALT SERPL-CCNC: 11 U/L
ANION GAP SERPL CALC-SCNC: 11 MMOL/L
APPEARANCE: CLEAR
AST SERPL-CCNC: 20 U/L
BACTERIA: ABNORMAL /HPF
BASOPHILS # BLD AUTO: 0.04 K/UL
BASOPHILS NFR BLD AUTO: 0.8 %
BILIRUB SERPL-MCNC: 0.2 MG/DL
BILIRUBIN URINE: NEGATIVE
BLOOD URINE: ABNORMAL
BUN SERPL-MCNC: 17 MG/DL
CALCIUM SERPL-MCNC: 9.2 MG/DL
CAST: 0 /LPF
CHLORIDE SERPL-SCNC: 104 MMOL/L
CO2 SERPL-SCNC: 25 MMOL/L
COLOR: YELLOW
CREAT SERPL-MCNC: 0.54 MG/DL
EGFRCR SERPLBLD CKD-EPI 2021: 97 ML/MIN/1.73M2
EOSINOPHIL # BLD AUTO: 0.1 K/UL
EOSINOPHIL NFR BLD AUTO: 1.9 %
EPITHELIAL CELLS: 17 /HPF
FERRITIN SERPL-MCNC: 69 NG/ML
FOLATE SERPL-MCNC: 7.5 NG/ML
GLUCOSE QUALITATIVE U: NEGATIVE MG/DL
GLUCOSE SERPL-MCNC: 182 MG/DL
HCT VFR BLD CALC: 35.2 %
HGB BLD-MCNC: 11.1 G/DL
IMM GRANULOCYTES NFR BLD AUTO: 0.2 %
IRON SATN MFR SERPL: 29 %
IRON SERPL-MCNC: 72 UG/DL
KETONES URINE: ABNORMAL MG/DL
LEUKOCYTE ESTERASE URINE: ABNORMAL
LYMPHOCYTES # BLD AUTO: 1.02 K/UL
LYMPHOCYTES NFR BLD AUTO: 19.3 %
MAN DIFF?: NORMAL
MCHC RBC-ENTMCNC: 28.3 PG
MCHC RBC-ENTMCNC: 31.5 G/DL
MCV RBC AUTO: 89.8 FL
MICROSCOPIC-UA: NORMAL
MONOCYTES # BLD AUTO: 0.4 K/UL
MONOCYTES NFR BLD AUTO: 7.6 %
NEUTROPHILS # BLD AUTO: 3.72 K/UL
NEUTROPHILS NFR BLD AUTO: 70.2 %
NITRITE URINE: NEGATIVE
PH URINE: 6
PLATELET # BLD AUTO: 199 K/UL
POTASSIUM SERPL-SCNC: 3.9 MMOL/L
PROT SERPL-MCNC: 6.9 G/DL
PROTEIN URINE: NEGATIVE MG/DL
RBC # BLD: 3.92 M/UL
RBC # FLD: 15.3 %
RED BLOOD CELLS URINE: 25 /HPF
REVIEW: NORMAL
SODIUM SERPL-SCNC: 139 MMOL/L
SPECIFIC GRAVITY URINE: 1.02
TIBC SERPL-MCNC: 250 UG/DL
UIBC SERPL-MCNC: 178 UG/DL
UROBILINOGEN URINE: 0.2 MG/DL
VIT B12 SERPL-MCNC: 565 PG/ML
WBC # FLD AUTO: 5.29 K/UL
WHITE BLOOD CELLS URINE: 1 /HPF

## 2025-03-25 ENCOUNTER — APPOINTMENT (OUTPATIENT)
Dept: INTERNAL MEDICINE | Facility: CLINIC | Age: 75
End: 2025-03-25

## 2025-04-22 ENCOUNTER — APPOINTMENT (OUTPATIENT)
Dept: HEMATOLOGY ONCOLOGY | Facility: CLINIC | Age: 75
End: 2025-04-22

## (undated) DEVICE — NUCLEUS HYDRODISSECTOR PEARCE ANGLED 25G X 22MM

## (undated) DEVICE — APPLICATOR COTTON TIP 3" STERILE

## (undated) DEVICE — CANNULA ANT CHMBR 27GX22MM

## (undated) DEVICE — KNIFE ALCON SLIT INTREPID CLEAR-CUT SAFETY 2.4MM

## (undated) DEVICE — KIT CENTURION ANTERIOR

## (undated) DEVICE — KNIFE ALCON MVR V-LANCE 20G (WHITE)

## (undated) DEVICE — RETRACTOR SYNERGETICS IRIS FLEXIBLE DISP

## (undated) DEVICE — PACK ANTERIOR SEGMENT

## (undated) DEVICE — SOL IRR BAG BSS 500ML

## (undated) DEVICE — SUT ETHILON 10-0 12" TG160-4

## (undated) DEVICE — MARKING PEN W RULER

## (undated) DEVICE — PACK CENTURION 2.4MM

## (undated) DEVICE — DRAPE MICROSCOPE KNOB COVER SMALL (2 PCS)

## (undated) DEVICE — Device

## (undated) DEVICE — GLV 7.5 PROTEXIS (WHITE)